# Patient Record
Sex: FEMALE | Race: WHITE | NOT HISPANIC OR LATINO | URBAN - METROPOLITAN AREA
[De-identification: names, ages, dates, MRNs, and addresses within clinical notes are randomized per-mention and may not be internally consistent; named-entity substitution may affect disease eponyms.]

---

## 2024-04-16 ENCOUNTER — OFFICE VISIT (OUTPATIENT)
Dept: PRIMARY CARE CLINIC | Facility: CLINIC | Age: 29
End: 2024-04-16
Payer: COMMERCIAL

## 2024-04-16 VITALS
OXYGEN SATURATION: 98 % | HEIGHT: 62 IN | HEART RATE: 78 BPM | DIASTOLIC BLOOD PRESSURE: 82 MMHG | SYSTOLIC BLOOD PRESSURE: 136 MMHG | WEIGHT: 220.31 LBS | TEMPERATURE: 98 F | BODY MASS INDEX: 40.54 KG/M2

## 2024-04-16 DIAGNOSIS — Z00.00 WELLNESS EXAMINATION: ICD-10-CM

## 2024-04-16 DIAGNOSIS — E66.09 CLASS 1 OBESITY DUE TO EXCESS CALORIES WITHOUT SERIOUS COMORBIDITY IN ADULT, UNSPECIFIED BMI: ICD-10-CM

## 2024-04-16 DIAGNOSIS — R73.9 HYPERGLYCEMIA: Primary | ICD-10-CM

## 2024-04-16 LAB
ALBUMIN SERPL-MCNC: 3.9 G/DL (ref 3.5–5)
ALBUMIN/GLOB SERPL: 1.1 RATIO (ref 1.1–2)
ALP SERPL-CCNC: 74 UNIT/L (ref 40–150)
ALT SERPL-CCNC: 39 UNIT/L (ref 0–55)
APPEARANCE UR: CLEAR
AST SERPL-CCNC: 32 UNIT/L (ref 5–34)
BACTERIA #/AREA URNS AUTO: ABNORMAL /HPF
BILIRUB SERPL-MCNC: 0.3 MG/DL
BILIRUB UR QL STRIP.AUTO: NEGATIVE
BUN SERPL-MCNC: 6.9 MG/DL (ref 7–18.7)
CALCIUM SERPL-MCNC: 9.5 MG/DL (ref 8.4–10.2)
CHLORIDE SERPL-SCNC: 105 MMOL/L (ref 98–107)
CHOLEST SERPL-MCNC: 212 MG/DL
CHOLEST/HDLC SERPL: 5 {RATIO} (ref 0–5)
CO2 SERPL-SCNC: 25 MMOL/L (ref 22–29)
COLOR UR AUTO: COLORLESS
CREAT SERPL-MCNC: 0.78 MG/DL (ref 0.55–1.02)
EST. AVERAGE GLUCOSE BLD GHB EST-MCNC: 102.5 MG/DL
ESTRADIOL SERPL HS-MCNC: 116 PG/ML
FERRITIN SERPL-MCNC: 95.31 NG/ML (ref 4.63–204)
FSH SERPL-ACNC: 5.12 MIU/ML
GFR SERPLBLD CREATININE-BSD FMLA CKD-EPI: >60 MLS/MIN/1.73/M2
GLOBULIN SER-MCNC: 3.5 GM/DL (ref 2.4–3.5)
GLUCOSE SERPL-MCNC: 115 MG/DL (ref 74–100)
GLUCOSE UR QL STRIP.AUTO: NORMAL
HBA1C MFR BLD: 5.2 %
HDLC SERPL-MCNC: 47 MG/DL (ref 35–60)
KETONES UR QL STRIP.AUTO: NEGATIVE
LDLC SERPL CALC-MCNC: 132 MG/DL (ref 50–140)
LEUKOCYTE ESTERASE UR QL STRIP.AUTO: 500
LH SERPL-ACNC: 6.19 MIU/ML
NITRITE UR QL STRIP.AUTO: NEGATIVE
PH UR STRIP.AUTO: 5.5 [PH]
POTASSIUM SERPL-SCNC: 4.6 MMOL/L (ref 3.5–5.1)
PROGEST SERPL-MCNC: 5.4 NG/ML
PROT SERPL-MCNC: 7.4 GM/DL (ref 6.4–8.3)
PROT UR QL STRIP.AUTO: NEGATIVE
RBC #/AREA URNS AUTO: ABNORMAL /HPF
RBC UR QL AUTO: NEGATIVE
SODIUM SERPL-SCNC: 138 MMOL/L (ref 136–145)
SP GR UR STRIP.AUTO: 1.01 (ref 1–1.03)
SPERM URNS QL MICRO: ABNORMAL /HPF
SQUAMOUS #/AREA URNS LPF: ABNORMAL /HPF
TRIGL SERPL-MCNC: 163 MG/DL (ref 37–140)
TSH SERPL-ACNC: 1.86 UIU/ML (ref 0.35–4.94)
UROBILINOGEN UR STRIP-ACNC: NORMAL
VLDLC SERPL CALC-MCNC: 33 MG/DL
WBC #/AREA URNS AUTO: ABNORMAL /HPF

## 2024-04-16 PROCEDURE — 84144 ASSAY OF PROGESTERONE: CPT | Performed by: FAMILY MEDICINE

## 2024-04-16 PROCEDURE — 83002 ASSAY OF GONADOTROPIN (LH): CPT | Performed by: FAMILY MEDICINE

## 2024-04-16 PROCEDURE — 80061 LIPID PANEL: CPT | Performed by: FAMILY MEDICINE

## 2024-04-16 PROCEDURE — 1160F RVW MEDS BY RX/DR IN RCRD: CPT | Mod: CPTII,,, | Performed by: FAMILY MEDICINE

## 2024-04-16 PROCEDURE — 81001 URINALYSIS AUTO W/SCOPE: CPT | Performed by: FAMILY MEDICINE

## 2024-04-16 PROCEDURE — 1159F MED LIST DOCD IN RCRD: CPT | Mod: CPTII,,, | Performed by: FAMILY MEDICINE

## 2024-04-16 PROCEDURE — 83036 HEMOGLOBIN GLYCOSYLATED A1C: CPT | Performed by: FAMILY MEDICINE

## 2024-04-16 PROCEDURE — 84443 ASSAY THYROID STIM HORMONE: CPT | Performed by: FAMILY MEDICINE

## 2024-04-16 PROCEDURE — 3075F SYST BP GE 130 - 139MM HG: CPT | Mod: CPTII,,, | Performed by: FAMILY MEDICINE

## 2024-04-16 PROCEDURE — 3079F DIAST BP 80-89 MM HG: CPT | Mod: CPTII,,, | Performed by: FAMILY MEDICINE

## 2024-04-16 PROCEDURE — 82670 ASSAY OF TOTAL ESTRADIOL: CPT | Performed by: FAMILY MEDICINE

## 2024-04-16 PROCEDURE — 99385 PREV VISIT NEW AGE 18-39: CPT | Mod: ,,, | Performed by: FAMILY MEDICINE

## 2024-04-16 PROCEDURE — 80053 COMPREHEN METABOLIC PANEL: CPT | Performed by: FAMILY MEDICINE

## 2024-04-16 PROCEDURE — 3008F BODY MASS INDEX DOCD: CPT | Mod: CPTII,,, | Performed by: FAMILY MEDICINE

## 2024-04-16 PROCEDURE — 82728 ASSAY OF FERRITIN: CPT | Performed by: FAMILY MEDICINE

## 2024-04-16 PROCEDURE — 83001 ASSAY OF GONADOTROPIN (FSH): CPT | Performed by: FAMILY MEDICINE

## 2024-04-16 PROCEDURE — 36415 COLL VENOUS BLD VENIPUNCTURE: CPT | Performed by: FAMILY MEDICINE

## 2024-04-16 RX ORDER — METFORMIN HYDROCHLORIDE 500 MG/1
500 TABLET ORAL
COMMUNITY

## 2024-04-16 NOTE — ASSESSMENT & PLAN NOTE
Will follow up on blood work.  Referral to GYN.  If unable to get it we can reschedule for pap smear visit.

## 2024-04-16 NOTE — PROGRESS NOTES
"  Family Medicine    Patient ID: [id number]     Chief Complaint: Establish Care (New patient to establish care)      HPI:     Rita Guan is a 29 y.o. female here today for an annual wellness visit and establish care.  On metformin by family NP, unsure why.  Last blood work 2022.  Last pap about 5 years ago.  Plan to see GYN.  Was on anxiety meds in the past.  Does not want to be on any and she stopped OCPs due to wanting to get pregnant.  Stopped OCPs 3 months ago.     Health Maintenance         Date Due Completion Date    Hepatitis C Screening Never done ---    Lipid Panel Never done ---    HIV Screening Never done ---    TETANUS VACCINE Never done ---    Pap Smear Never done ---    Influenza Vaccine (1) Never done ---    COVID-19 Vaccine (1 - 2023-24 season) Never done ---             No past medical history on file.     Past Surgical History:   Procedure Laterality Date    wisdom teeth removal          Social History     Tobacco Use    Smoking status: Never    Smokeless tobacco: Never   Substance and Sexual Activity    Alcohol use: Not Currently    Drug use: Never    Sexual activity: Yes        Current Outpatient Medications   Medication Instructions    metFORMIN (GLUCOPHAGE) 500 mg, Oral, With breakfast       Review of patient's allergies indicates:   Allergen Reactions    Hydrocodone Hives        Patient Care Team:  Joyce Jackson MD as PCP - General (Family Medicine)     Subjective:     Review of Systems    12 point review of systems conducted, negative except as stated in the history of present illness. See HPI for details.    Objective:     Visit Vitals  /82   Pulse 78   Temp 97.6 °F (36.4 °C)   Ht 5' 2" (1.575 m)   Wt 99.9 kg (220 lb 4.8 oz)   SpO2 98%   BMI 40.29 kg/m²       Physical Exam  Vitals and nursing note reviewed.   Constitutional:       Appearance: Normal appearance.   HENT:      Mouth/Throat:      Mouth: Mucous membranes are moist.   Cardiovascular:      Rate and Rhythm: Normal rate " "and regular rhythm.   Pulmonary:      Effort: Pulmonary effort is normal.      Breath sounds: Normal breath sounds.   Neurological:      General: No focal deficit present.      Mental Status: She is alert.   Psychiatric:         Mood and Affect: Mood normal.         Labs Reviewed:     Chemistry:  No results found for: "NA", "K", "CHLORIDE", "BUN", "CREATININE", "EGFRNORACEVR", "GLUCOSE", "CALCIUM", "ALKPHOS", "LABPROT", "ALBUMIN", "BILIDIR", "IBILI", "AST", "ALT", "MG", "PHOS", "[id number]", "TSH", "[id number]", "PSA"     No results found for: "HGBA1C", "MICROALBCREA"     Hematology:  No results found for: "WBC", "HGB", "HCT", "PLT"    Lipid Panel:  No results found for: "CHOL", "HDL", "LDL", "TRIG", "TOTALCHOLEST"     Urine:  No results found for: "COLORUA", "APPEARANCEUA", "SGUA", "PHUA", "PROTEINUA", "GLUCOSEUA", "KETONESUA", "BLOODUA", "NITRITESUA", "LEUKOCYTESUR", "RBCUA", "WBCUA", "BACTERIA", "SQEPUA", "HYALINECASTS", "CREATRANDUR", "PROTEINURINE", "UPROTCREA"     Assessment:       ICD-10-CM ICD-9-CM   1. Hyperglycemia  R73.9 790.29   2. Class 1 obesity due to excess calories without serious comorbidity in adult, unspecified BMI  E66.09 278.00   3. Wellness examination  Z00.00 V70.0        Plan:     Cervical Cancer Screening -  due, will try to get into GYN    Eye Exam - Recommend annually.    Dental Exam - Recommend biannual exams.     Vaccinations -   There is no immunization history on file for this patient.       1. Hyperglycemia  Assessment & Plan:  Continue metformin for now.  A1C ordered.   Increase vegetables in diet.     Orders:  -     CBC Auto Differential; Future; Expected date: 04/16/2024  -     Comprehensive Metabolic Panel; Future; Expected date: 04/16/2024  -     Lipid Panel; Future; Expected date: 04/16/2024  -     TSH; Future; Expected date: 04/16/2024  -     Hemoglobin A1C; Future; Expected date: 04/16/2024  -     Urinalysis; Future; Expected date: 04/16/2024  -     Ambulatory " referral/consult to Obstetrics / Gynecology; Future; Expected date: 04/23/2024  -     Estradiol; Future; Expected date: 04/16/2024  -     Ferritin; Future; Expected date: 04/16/2024  -     Luteinizing Hormone; Future; Expected date: 04/16/2024  -     Progesterone; Future; Expected date: 04/16/2024  -     Follicle Stimulating Hormone; Future; Expected date: 04/16/2024    2. Class 1 obesity due to excess calories without serious comorbidity in adult, unspecified BMI  Assessment & Plan:  Regular exercise, was on mounjaro. Consider medication to assist with weight management.     Orders:  -     CBC Auto Differential; Future; Expected date: 04/16/2024  -     Comprehensive Metabolic Panel; Future; Expected date: 04/16/2024  -     Lipid Panel; Future; Expected date: 04/16/2024  -     TSH; Future; Expected date: 04/16/2024  -     Hemoglobin A1C; Future; Expected date: 04/16/2024  -     Urinalysis; Future; Expected date: 04/16/2024  -     Ambulatory referral/consult to Obstetrics / Gynecology; Future; Expected date: 04/23/2024  -     Estradiol; Future; Expected date: 04/16/2024  -     Ferritin; Future; Expected date: 04/16/2024  -     Luteinizing Hormone; Future; Expected date: 04/16/2024  -     Progesterone; Future; Expected date: 04/16/2024  -     Follicle Stimulating Hormone; Future; Expected date: 04/16/2024    3. Wellness examination  Assessment & Plan:  Will follow up on blood work.  Referral to GYN.  If unable to get it we can reschedule for pap smear visit.            Follow up in about 6 weeks (around 5/28/2024) for Follow Up, pap if unable to get into GYN.. In addition to their scheduled follow up, the patient has also been instructed to follow up on as needed basis.     Future Appointments   Date Time Provider Department Center   4/16/2024 10:00 AM [facility]

## 2024-04-17 NOTE — PROGRESS NOTES
Glucose slightly elevated and cholesterol elevated. Otherwise labs within acceptable ranges. No change in treatment needed at this time.  Since you are planning to get pregnant we will hold off on starting a statin medication but please follow a strict low fat, high vegetable diet.

## 2024-04-29 ENCOUNTER — OFFICE VISIT (OUTPATIENT)
Dept: PRIMARY CARE CLINIC | Facility: CLINIC | Age: 29
End: 2024-04-29
Payer: COMMERCIAL

## 2024-04-29 VITALS
HEIGHT: 62 IN | SYSTOLIC BLOOD PRESSURE: 135 MMHG | DIASTOLIC BLOOD PRESSURE: 86 MMHG | WEIGHT: 220.13 LBS | OXYGEN SATURATION: 97 % | BODY MASS INDEX: 40.51 KG/M2 | HEART RATE: 107 BPM

## 2024-04-29 DIAGNOSIS — R73.9 HYPERGLYCEMIA: ICD-10-CM

## 2024-04-29 DIAGNOSIS — Z34.91 FIRST TRIMESTER PREGNANCY: Primary | ICD-10-CM

## 2024-04-29 PROCEDURE — 3044F HG A1C LEVEL LT 7.0%: CPT | Mod: CPTII,,, | Performed by: FAMILY MEDICINE

## 2024-04-29 PROCEDURE — 1159F MED LIST DOCD IN RCRD: CPT | Mod: CPTII,,, | Performed by: FAMILY MEDICINE

## 2024-04-29 PROCEDURE — 1160F RVW MEDS BY RX/DR IN RCRD: CPT | Mod: CPTII,,, | Performed by: FAMILY MEDICINE

## 2024-04-29 PROCEDURE — 99214 OFFICE O/P EST MOD 30 MIN: CPT | Mod: ,,, | Performed by: FAMILY MEDICINE

## 2024-04-29 PROCEDURE — 3075F SYST BP GE 130 - 139MM HG: CPT | Mod: CPTII,,, | Performed by: FAMILY MEDICINE

## 2024-04-29 PROCEDURE — 3079F DIAST BP 80-89 MM HG: CPT | Mod: CPTII,,, | Performed by: FAMILY MEDICINE

## 2024-04-29 PROCEDURE — 3008F BODY MASS INDEX DOCD: CPT | Mod: CPTII,,, | Performed by: FAMILY MEDICINE

## 2024-04-29 RX ORDER — INSULIN PUMP SYRINGE, 3 ML
EACH MISCELLANEOUS
Qty: 1 EACH | Refills: 0 | Status: SHIPPED | OUTPATIENT
Start: 2024-04-29

## 2024-04-29 RX ORDER — LANCETS
EACH MISCELLANEOUS
Qty: 120 EACH | Refills: 11 | Status: SHIPPED | OUTPATIENT
Start: 2024-04-29

## 2024-04-29 NOTE — PROGRESS NOTES
"  Family Medicine    Patient ID: [id number]     Chief Complaint: Follow-up (Possible pregnancy)      HPI:     Rita Guan is a 29 y.o. female here today for a follow up. Pt trying to conceive, missed her last period.  Reviewed labs done since last visit.  Overall non concerning.  Triglycerides elevated.   LMP 4 weeks ago.      Past Surgical History:   Procedure Laterality Date    wisdom teeth removal          Social History     Tobacco Use    Smoking status: Never    Smokeless tobacco: Never   Substance and Sexual Activity    Alcohol use: Not Currently    Drug use: Never    Sexual activity: Yes        Current Outpatient Medications   Medication Instructions    blood sugar diagnostic Strp To check BG four times daily, to use with insurance preferred meter    blood-glucose meter kit To check BG four times daily, to use with insurance preferred meter    lancets Misc To check BG four times daily, to use with insurance preferred meter    metFORMIN (GLUCOPHAGE) 500 mg, With breakfast    prenat.vits,amanda,min-iron-folic Tab 1 tablet, Oral, Daily       Review of patient's allergies indicates:   Allergen Reactions    Hydrocodone Hives        Patient Care Team:  Joyce Jackson MD as PCP - General (Family Medicine)     Subjective:     Review of Systems    12 point review of systems conducted, negative except as stated in the history of present illness. See HPI for details.    Objective:     Visit Vitals  /86   Pulse 107   Ht 5' 2" (1.575 m)   Wt 99.8 kg (220 lb 1.6 oz)   SpO2 97%   BMI 40.26 kg/m²       Physical Exam  Vitals and nursing note reviewed.   Constitutional:       Appearance: Normal appearance.   HENT:      Mouth/Throat:      Mouth: Mucous membranes are moist.   Cardiovascular:      Rate and Rhythm: Normal rate and regular rhythm.   Pulmonary:      Effort: Pulmonary effort is normal.      Breath sounds: Normal breath sounds.   Neurological:      General: No focal deficit present.      Mental Status: She is " "alert.   Psychiatric:         Mood and Affect: Mood normal.         Labs Reviewed:     Chemistry:  Lab Results   Component Value Date     04/16/2024    K 4.6 04/16/2024    CHLORIDE 105 04/16/2024    BUN 6.9 (L) 04/16/2024    CREATININE 0.78 04/16/2024    EGFRNORACEVR >60 04/16/2024    GLUCOSE 115 (H) 04/16/2024    CALCIUM 9.5 04/16/2024    ALKPHOS 74 04/16/2024    LABPROT 7.4 04/16/2024    ALBUMIN 3.9 04/16/2024    AST 32 04/16/2024    ALT 39 04/16/2024    TSH 1.865 04/16/2024        Lab Results   Component Value Date    HGBA1C 5.2 04/16/2024        Hematology:  No results found for: "WBC", "HGB", "HCT", "PLT"    Lipid Panel:  Lab Results   Component Value Date    CHOL 212 (H) 04/16/2024    HDL 47 04/16/2024    .00 04/16/2024    TRIG 163 (H) 04/16/2024    TOTALCHOLEST 5 04/16/2024        Urine:  Lab Results   Component Value Date    COLORUA Colorless 04/16/2024    APPEARANCEUA Clear 04/16/2024    SGUA 1.007 04/16/2024    PHUA 5.5 04/16/2024    PROTEINUA Negative 04/16/2024    GLUCOSEUA Normal 04/16/2024    KETONESUA Negative 04/16/2024    BLOODUA Negative 04/16/2024    NITRITESUA Negative 04/16/2024    LEUKOCYTESUR 500 (A) 04/16/2024    RBCUA 0-5 04/16/2024    WBCUA 6-10 (A) 04/16/2024    BACTERIA Trace 04/16/2024    SQEPUA Trace 04/16/2024        Assessment:       ICD-10-CM ICD-9-CM   1. First trimester pregnancy  Z34.91 V22.2   2. Hyperglycemia  R73.9 790.29        Plan:     1. First trimester pregnancy  Assessment & Plan:  OB referral placed. Discussed food and medication avoidance in pregnancy and anticipated changes.  Continue prenatal vitamin.  A prescription for a prenatal vitamin sent.     Orders:  -     blood-glucose meter kit; To check BG four times daily, to use with insurance preferred meter  Dispense: 1 each; Refill: 0  -     lancets Misc; To check BG four times daily, to use with insurance preferred meter  Dispense: 120 each; Refill: 11  -     blood sugar diagnostic Strp; To check BG four " times daily, to use with insurance preferred meter  Dispense: 120 each; Refill: 11  -     Ambulatory referral/consult to Obstetrics / Gynecology; Future; Expected date: 05/06/2024    2. Hyperglycemia  Assessment & Plan:  Check glucose in the morning.  Goal of less than 125.  Ok to stop metformin for now.      Orders:  -     blood-glucose meter kit; To check BG four times daily, to use with insurance preferred meter  Dispense: 1 each; Refill: 0  -     lancets Misc; To check BG four times daily, to use with insurance preferred meter  Dispense: 120 each; Refill: 11  -     blood sugar diagnostic Strp; To check BG four times daily, to use with insurance preferred meter  Dispense: 120 each; Refill: 11    Other orders  -     prenat.vits,amanda,min-iron-folic Tab; Take 1 tablet by mouth once daily.  Dispense: 90 each; Refill: 3         Follow up in about 1 year (around 4/29/2025) for Annual Wellness. In addition to their scheduled follow up, the patient has also been instructed to follow up on as needed basis.     No future appointments.     Joyce Jackson MD

## 2024-04-29 NOTE — ASSESSMENT & PLAN NOTE
OB referral placed. Discussed food and medication avoidance in pregnancy and anticipated changes.  Continue prenatal vitamin.  A prescription for a prenatal vitamin sent.

## 2024-07-19 ENCOUNTER — OFFICE VISIT (OUTPATIENT)
Dept: PRIMARY CARE CLINIC | Facility: CLINIC | Age: 29
End: 2024-07-19
Payer: COMMERCIAL

## 2024-07-19 VITALS
TEMPERATURE: 98 F | SYSTOLIC BLOOD PRESSURE: 108 MMHG | RESPIRATION RATE: 17 BRPM | WEIGHT: 221 LBS | BODY MASS INDEX: 40.67 KG/M2 | HEART RATE: 90 BPM | HEIGHT: 62 IN | DIASTOLIC BLOOD PRESSURE: 65 MMHG

## 2024-07-19 DIAGNOSIS — J20.9 ACUTE BRONCHITIS, UNSPECIFIED ORGANISM: Primary | ICD-10-CM

## 2024-07-19 PROCEDURE — 99213 OFFICE O/P EST LOW 20 MIN: CPT | Mod: ,,, | Performed by: FAMILY MEDICINE

## 2024-07-19 PROCEDURE — 3008F BODY MASS INDEX DOCD: CPT | Mod: CPTII,,, | Performed by: FAMILY MEDICINE

## 2024-07-19 PROCEDURE — 1159F MED LIST DOCD IN RCRD: CPT | Mod: CPTII,,, | Performed by: FAMILY MEDICINE

## 2024-07-19 PROCEDURE — 1160F RVW MEDS BY RX/DR IN RCRD: CPT | Mod: CPTII,,, | Performed by: FAMILY MEDICINE

## 2024-07-19 PROCEDURE — 3074F SYST BP LT 130 MM HG: CPT | Mod: CPTII,,, | Performed by: FAMILY MEDICINE

## 2024-07-19 PROCEDURE — 3078F DIAST BP <80 MM HG: CPT | Mod: CPTII,,, | Performed by: FAMILY MEDICINE

## 2024-07-19 PROCEDURE — 3044F HG A1C LEVEL LT 7.0%: CPT | Mod: CPTII,,, | Performed by: FAMILY MEDICINE

## 2024-07-19 RX ORDER — ALBUTEROL SULFATE 90 UG/1
2 AEROSOL, METERED RESPIRATORY (INHALATION) EVERY 6 HOURS PRN
Qty: 18 G | Refills: 0 | Status: SHIPPED | OUTPATIENT
Start: 2024-07-19 | End: 2025-07-19

## 2024-07-19 NOTE — PROGRESS NOTES
Family Medicine    Patient ID: [id number]     Chief Complaint: No chief complaint on file.      HPI:     Rita Guan is a 29 y.o. female here today for 5 days of cough, congestion, pharyngitis.  No fever or CP.  16 weeks pregnant.     History reviewed. No pertinent past medical history.     Past Surgical History:   Procedure Laterality Date    wisdom teeth removal          Social History     Tobacco Use    Smoking status: Never    Smokeless tobacco: Never   Substance and Sexual Activity    Alcohol use: Not Currently    Drug use: Never    Sexual activity: Yes     Partners: Male        Current Outpatient Medications   Medication Instructions    albuterol (PROVENTIL HFA) 90 mcg/actuation inhaler 2 puffs, Inhalation, Every 6 hours PRN, Rescue    blood sugar diagnostic Strp To check BG four times daily, to use with insurance preferred meter    blood-glucose meter kit To check BG four times daily, to use with insurance preferred meter    lancets Misc To check BG four times daily, to use with insurance preferred meter    prenat.vits,amanda,min-iron-folic Tab 1 tablet, Oral, Daily       Review of patient's allergies indicates:   Allergen Reactions    Hydrocodone Hives        Patient Care Team:  Joyce Jackson MD as PCP - General (Family Medicine)     Subjective:     Review of Systems   HENT:  Positive for sore throat. Negative for congestion, drooling, ear discharge, ear pain and trouble swallowing.    Respiratory:  Positive for cough. Negative for shortness of breath and stridor.    Gastrointestinal:  Negative for abdominal pain, diarrhea and vomiting.   Musculoskeletal:  Positive for neck pain.   Neurological:  Positive for headaches.       12 point review of systems conducted, negative except as stated in the history of present illness. See HPI for details.    Objective:     Visit Vitals  /65 (BP Location: Left arm, Patient Position: Sitting, BP Method: Large (Automatic))   Pulse 90   Temp 98.3 °F (36.8 °C)  "  Resp 17   Ht 5' 2" (1.575 m)   Wt 100.2 kg (221 lb)   BMI 40.42 kg/m²       Physical Exam  Vitals and nursing note reviewed.   Constitutional:       Appearance: Normal appearance. She is obese. She is not ill-appearing.   HENT:      Right Ear: Ear canal and external ear normal.      Left Ear: Ear canal and external ear normal.      Ears:      Comments: Fluid B TM     Nose: Congestion present.      Mouth/Throat:      Pharynx: Posterior oropharyngeal erythema present.      Comments: Pos PND.   Eyes:      Conjunctiva/sclera: Conjunctivae normal.   Cardiovascular:      Rate and Rhythm: Normal rate and regular rhythm.   Pulmonary:      Effort: Pulmonary effort is normal.      Breath sounds: Wheezing present.   Lymphadenopathy:      Cervical: No cervical adenopathy.   Skin:     General: Skin is warm.   Neurological:      General: No focal deficit present.      Mental Status: She is alert.   Psychiatric:         Mood and Affect: Mood normal.         Labs Reviewed:     Chemistry:  Lab Results   Component Value Date     04/16/2024    K 4.6 04/16/2024    BUN 6.9 (L) 04/16/2024    CREATININE 0.78 04/16/2024    EGFRNORACEVR >60 04/16/2024    GLUCOSE 115 (H) 04/16/2024    CALCIUM 9.5 04/16/2024    ALKPHOS 74 04/16/2024    LABPROT 7.4 04/16/2024    ALBUMIN 3.9 04/16/2024    AST 32 04/16/2024    ALT 39 04/16/2024    TSH 1.865 04/16/2024        Lab Results   Component Value Date    HGBA1C 5.2 04/16/2024        Hematology:  No results found for: "WBC", "HGB", "HCT", "PLT"    Lipid Panel:  Lab Results   Component Value Date    CHOL 212 (H) 04/16/2024    HDL 47 04/16/2024    .00 04/16/2024    TRIG 163 (H) 04/16/2024    TOTALCHOLEST 5 04/16/2024        Urine:  Lab Results   Component Value Date    APPEARANCEUA Clear 04/16/2024    SGUA 1.007 04/16/2024    PROTEINUA Negative 04/16/2024    KETONESUA Negative 04/16/2024    LEUKOCYTESUR 500 (A) 04/16/2024    RBCUA 0-5 04/16/2024    WBCUA 6-10 (A) 04/16/2024    BACTERIA Trace " 04/16/2024    SQEPUA Trace 04/16/2024        Assessment:       ICD-10-CM ICD-9-CM   1. Acute bronchitis, unspecified organism  J20.9 466.0        Plan:     1. Acute bronchitis, unspecified organism  Assessment & Plan:  Flonase and saline nasal spray twice a day, antihistamine at bedtime.  Force fluids.  Use albuterol inhaler or nebulizer two puffs every 4-6 hours as needed for wheeze. Cough may linger a few weeks but should not have fever, chest pain, or shortness of breath.       Orders:  -     albuterol (PROVENTIL HFA) 90 mcg/actuation inhaler; Inhale 2 puffs into the lungs every 6 (six) hours as needed for Wheezing. Rescue  Dispense: 18 g; Refill: 0         No follow-ups on file. In addition to their scheduled follow up, the patient has also been instructed to follow up on as needed basis.     Future Appointments   Date Time Provider Department Center   4/29/2025 10:00 AM [facility]

## 2024-07-22 PROBLEM — Z00.00 WELLNESS EXAMINATION: Status: RESOLVED | Noted: 2024-04-16 | Resolved: 2024-07-22

## 2024-11-29 ENCOUNTER — HOSPITAL ENCOUNTER (INPATIENT)
Facility: HOSPITAL | Age: 29
LOS: 4 days | Discharge: HOME | End: 2024-12-03
Attending: OBSTETRICS & GYNECOLOGY | Admitting: OBSTETRICS & GYNECOLOGY
Payer: COMMERCIAL

## 2024-11-29 ENCOUNTER — ANESTHESIA EVENT (OUTPATIENT)
Dept: OBSTETRICS AND GYNECOLOGY | Facility: HOSPITAL | Age: 29
End: 2024-11-29
Payer: COMMERCIAL

## 2024-11-29 ENCOUNTER — ANESTHESIA (OUTPATIENT)
Dept: OBSTETRICS AND GYNECOLOGY | Facility: HOSPITAL | Age: 29
End: 2024-11-29
Payer: COMMERCIAL

## 2024-11-29 DIAGNOSIS — Z98.891 STATUS POST PRIMARY LOW TRANSVERSE CESAREAN SECTION: Primary | ICD-10-CM

## 2024-11-29 DIAGNOSIS — O16.9 HYPERTENSION AFFECTING PREGNANCY: ICD-10-CM

## 2024-11-29 LAB
ABORH RETYPE: NORMAL
ALBUMIN SERPL-MCNC: 2.3 G/DL (ref 3.5–5)
ALBUMIN/GLOB SERPL: 0.8 RATIO (ref 1.1–2)
ALP SERPL-CCNC: 109 UNIT/L (ref 40–150)
ALT SERPL-CCNC: 31 UNIT/L (ref 0–55)
ANION GAP SERPL CALC-SCNC: 10 MEQ/L
AST SERPL-CCNC: 56 UNIT/L (ref 5–34)
BASOPHILS # BLD AUTO: 0.04 X10(3)/MCL
BASOPHILS NFR BLD AUTO: 0.3 %
BILIRUB SERPL-MCNC: 0.3 MG/DL
BUN SERPL-MCNC: 11.9 MG/DL (ref 7–18.7)
CALCIUM SERPL-MCNC: 8.6 MG/DL (ref 8.4–10.2)
CHLORIDE SERPL-SCNC: 109 MMOL/L (ref 98–107)
CO2 SERPL-SCNC: 17 MMOL/L (ref 22–29)
CREAT SERPL-MCNC: 0.82 MG/DL (ref 0.55–1.02)
CREAT UR-MCNC: 264.6 MG/DL (ref 45–106)
CREAT/UREA NIT SERPL: 15
EOSINOPHIL # BLD AUTO: 0.11 X10(3)/MCL (ref 0–0.9)
EOSINOPHIL NFR BLD AUTO: 0.9 %
ERYTHROCYTE [DISTWIDTH] IN BLOOD BY AUTOMATED COUNT: 13.6 % (ref 11.5–17)
GFR SERPLBLD CREATININE-BSD FMLA CKD-EPI: >60 ML/MIN/1.73/M2
GLOBULIN SER-MCNC: 3 GM/DL (ref 2.4–3.5)
GLUCOSE SERPL-MCNC: 76 MG/DL (ref 74–100)
GROUP & RH: NORMAL
HBV SURFACE AG SERPL QL IA: NONREACTIVE
HCT VFR BLD AUTO: 39.2 % (ref 37–47)
HGB BLD-MCNC: 13.2 G/DL (ref 12–16)
IMM GRANULOCYTES # BLD AUTO: 0.09 X10(3)/MCL (ref 0–0.04)
IMM GRANULOCYTES NFR BLD AUTO: 0.7 %
INDIRECT COOMBS: NORMAL
LDH SERPL-CCNC: 308 U/L (ref 125–220)
LYMPHOCYTES # BLD AUTO: 1.9 X10(3)/MCL (ref 0.6–4.6)
LYMPHOCYTES NFR BLD AUTO: 14.9 %
MCH RBC QN AUTO: 28.8 PG (ref 27–31)
MCHC RBC AUTO-ENTMCNC: 33.7 G/DL (ref 33–36)
MCV RBC AUTO: 85.4 FL (ref 80–94)
MONOCYTES # BLD AUTO: 0.75 X10(3)/MCL (ref 0.1–1.3)
MONOCYTES NFR BLD AUTO: 5.9 %
NEUTROPHILS # BLD AUTO: 9.88 X10(3)/MCL (ref 2.1–9.2)
NEUTROPHILS NFR BLD AUTO: 77.3 %
NRBC BLD AUTO-RTO: 0 %
PLATELET # BLD AUTO: 163 X10(3)/MCL (ref 130–400)
PMV BLD AUTO: 10.6 FL (ref 7.4–10.4)
POTASSIUM SERPL-SCNC: 4.4 MMOL/L (ref 3.5–5.1)
PROT SERPL-MCNC: 5.3 GM/DL (ref 6.4–8.3)
PROT UR STRIP-MCNC: >2000 MG/DL
RBC # BLD AUTO: 4.59 X10(6)/MCL (ref 4.2–5.4)
SODIUM SERPL-SCNC: 136 MMOL/L (ref 136–145)
SPECIMEN OUTDATE: NORMAL
T PALLIDUM AB SER QL: NONREACTIVE
URATE SERPL-MCNC: 7.5 MG/DL (ref 2.6–6)
WBC # BLD AUTO: 12.77 X10(3)/MCL (ref 4.5–11.5)

## 2024-11-29 PROCEDURE — 80053 COMPREHEN METABOLIC PANEL: CPT | Performed by: SPECIALIST

## 2024-11-29 PROCEDURE — 62322 NJX INTERLAMINAR LMBR/SAC: CPT

## 2024-11-29 PROCEDURE — 87389 HIV-1 AG W/HIV-1&-2 AB AG IA: CPT | Performed by: SPECIALIST

## 2024-11-29 PROCEDURE — 63600175 PHARM REV CODE 636 W HCPCS: Performed by: SPECIALIST

## 2024-11-29 PROCEDURE — 37000008 HC ANESTHESIA 1ST 15 MINUTES: Performed by: SPECIALIST

## 2024-11-29 PROCEDURE — 87340 HEPATITIS B SURFACE AG IA: CPT | Performed by: SPECIALIST

## 2024-11-29 PROCEDURE — 25000003 PHARM REV CODE 250

## 2024-11-29 PROCEDURE — 85025 COMPLETE CBC W/AUTO DIFF WBC: CPT | Performed by: SPECIALIST

## 2024-11-29 PROCEDURE — 86762 RUBELLA ANTIBODY: CPT | Performed by: SPECIALIST

## 2024-11-29 PROCEDURE — 36415 COLL VENOUS BLD VENIPUNCTURE: CPT | Performed by: SPECIALIST

## 2024-11-29 PROCEDURE — 36004724 HC OB OR TIME LEV III - 1ST 15 MIN: Performed by: SPECIALIST

## 2024-11-29 PROCEDURE — 36004725 HC OB OR TIME LEV III - EA ADD 15 MIN: Performed by: SPECIALIST

## 2024-11-29 PROCEDURE — 84550 ASSAY OF BLOOD/URIC ACID: CPT | Performed by: SPECIALIST

## 2024-11-29 PROCEDURE — 86901 BLOOD TYPING SEROLOGIC RH(D): CPT | Performed by: SPECIALIST

## 2024-11-29 PROCEDURE — 86923 COMPATIBILITY TEST ELECTRIC: CPT | Performed by: SPECIALIST

## 2024-11-29 PROCEDURE — 86780 TREPONEMA PALLIDUM: CPT | Performed by: SPECIALIST

## 2024-11-29 PROCEDURE — 63600175 PHARM REV CODE 636 W HCPCS: Performed by: OBSTETRICS & GYNECOLOGY

## 2024-11-29 PROCEDURE — 63600175 PHARM REV CODE 636 W HCPCS

## 2024-11-29 PROCEDURE — 27000492 HC SLEEVE, SCD T/L

## 2024-11-29 PROCEDURE — 25000003 PHARM REV CODE 250: Performed by: SPECIALIST

## 2024-11-29 PROCEDURE — 37000009 HC ANESTHESIA EA ADD 15 MINS: Performed by: SPECIALIST

## 2024-11-29 PROCEDURE — 96372 THER/PROPH/DIAG INJ SC/IM: CPT | Performed by: SPECIALIST

## 2024-11-29 PROCEDURE — 96374 THER/PROPH/DIAG INJ IV PUSH: CPT

## 2024-11-29 PROCEDURE — 63600175 PHARM REV CODE 636 W HCPCS: Mod: JZ,JG | Performed by: ANESTHESIOLOGY

## 2024-11-29 PROCEDURE — 51702 INSERT TEMP BLADDER CATH: CPT

## 2024-11-29 PROCEDURE — 71000033 HC RECOVERY, INTIAL HOUR: Performed by: SPECIALIST

## 2024-11-29 PROCEDURE — 96376 TX/PRO/DX INJ SAME DRUG ADON: CPT

## 2024-11-29 PROCEDURE — 82570 ASSAY OF URINE CREATININE: CPT | Performed by: SPECIALIST

## 2024-11-29 PROCEDURE — 83615 LACTATE (LD) (LDH) ENZYME: CPT | Performed by: SPECIALIST

## 2024-11-29 PROCEDURE — 99285 EMERGENCY DEPT VISIT HI MDM: CPT | Mod: 25

## 2024-11-29 PROCEDURE — 11000001 HC ACUTE MED/SURG PRIVATE ROOM

## 2024-11-29 RX ORDER — CARBOPROST TROMETHAMINE 250 UG/ML
250 INJECTION, SOLUTION INTRAMUSCULAR
Status: DISCONTINUED | OUTPATIENT
Start: 2024-11-29 | End: 2024-12-03 | Stop reason: HOSPADM

## 2024-11-29 RX ORDER — OXYTOCIN-SODIUM CHLORIDE 0.9% IV SOLN 30 UNIT/500ML 30-0.9/5 UT/ML-%
95 SOLUTION INTRAVENOUS CONTINUOUS PRN
OUTPATIENT
Start: 2024-11-29

## 2024-11-29 RX ORDER — OXYTOCIN-SODIUM CHLORIDE 0.9% IV SOLN 30 UNIT/500ML 30-0.9/5 UT/ML-%
SOLUTION INTRAVENOUS
Status: DISCONTINUED | OUTPATIENT
Start: 2024-11-29 | End: 2024-11-29

## 2024-11-29 RX ORDER — ONDANSETRON HYDROCHLORIDE 2 MG/ML
4 INJECTION, SOLUTION INTRAVENOUS EVERY 12 HOURS PRN
Status: DISCONTINUED | OUTPATIENT
Start: 2024-11-29 | End: 2024-12-03 | Stop reason: HOSPADM

## 2024-11-29 RX ORDER — MAGNESIUM SULFATE HEPTAHYDRATE 40 MG/ML
4 INJECTION, SOLUTION INTRAVENOUS ONCE
Status: COMPLETED | OUTPATIENT
Start: 2024-11-29 | End: 2024-11-29

## 2024-11-29 RX ORDER — DIPHENOXYLATE HYDROCHLORIDE AND ATROPINE SULFATE 2.5; .025 MG/1; MG/1
2 TABLET ORAL EVERY 6 HOURS PRN
Status: DISCONTINUED | OUTPATIENT
Start: 2024-11-29 | End: 2024-12-03 | Stop reason: HOSPADM

## 2024-11-29 RX ORDER — METHYLERGONOVINE MALEATE 0.2 MG/ML
200 INJECTION INTRAVENOUS ONCE AS NEEDED
Status: DISCONTINUED | OUTPATIENT
Start: 2024-11-29 | End: 2024-12-03 | Stop reason: HOSPADM

## 2024-11-29 RX ORDER — PROCHLORPERAZINE EDISYLATE 5 MG/ML
5 INJECTION INTRAMUSCULAR; INTRAVENOUS EVERY 6 HOURS PRN
Status: DISCONTINUED | OUTPATIENT
Start: 2024-11-29 | End: 2024-12-03 | Stop reason: HOSPADM

## 2024-11-29 RX ORDER — MUPIROCIN 20 MG/G
OINTMENT TOPICAL
Status: DISCONTINUED | OUTPATIENT
Start: 2024-11-29 | End: 2024-12-03 | Stop reason: HOSPADM

## 2024-11-29 RX ORDER — CEFAZOLIN SODIUM 1 G/3ML
INJECTION, POWDER, FOR SOLUTION INTRAMUSCULAR; INTRAVENOUS
Status: DISCONTINUED | OUTPATIENT
Start: 2024-11-29 | End: 2024-11-29

## 2024-11-29 RX ORDER — OXYTOCIN-SODIUM CHLORIDE 0.9% IV SOLN 30 UNIT/500ML 30-0.9/5 UT/ML-%
10 SOLUTION INTRAVENOUS ONCE AS NEEDED
Status: DISCONTINUED | OUTPATIENT
Start: 2024-11-29 | End: 2024-12-03 | Stop reason: HOSPADM

## 2024-11-29 RX ORDER — OXYTOCIN 10 [USP'U]/ML
10 INJECTION, SOLUTION INTRAMUSCULAR; INTRAVENOUS ONCE AS NEEDED
Status: DISCONTINUED | OUTPATIENT
Start: 2024-11-29 | End: 2024-12-03 | Stop reason: HOSPADM

## 2024-11-29 RX ORDER — CALCIUM GLUCONATE 98 MG/ML
1 INJECTION, SOLUTION INTRAVENOUS
Status: DISCONTINUED | OUTPATIENT
Start: 2024-11-29 | End: 2024-12-03 | Stop reason: HOSPADM

## 2024-11-29 RX ORDER — SODIUM CITRATE AND CITRIC ACID MONOHYDRATE 334; 500 MG/5ML; MG/5ML
30 SOLUTION ORAL
Status: DISCONTINUED | OUTPATIENT
Start: 2024-11-29 | End: 2024-11-29

## 2024-11-29 RX ORDER — SODIUM CHLORIDE, SODIUM LACTATE, POTASSIUM CHLORIDE, CALCIUM CHLORIDE 600; 310; 30; 20 MG/100ML; MG/100ML; MG/100ML; MG/100ML
INJECTION, SOLUTION INTRAVENOUS CONTINUOUS
Status: DISCONTINUED | OUTPATIENT
Start: 2024-11-29 | End: 2024-12-03 | Stop reason: HOSPADM

## 2024-11-29 RX ORDER — FENTANYL CITRATE 50 UG/ML
INJECTION, SOLUTION INTRAMUSCULAR; INTRAVENOUS
Status: DISCONTINUED | OUTPATIENT
Start: 2024-11-29 | End: 2024-11-29

## 2024-11-29 RX ORDER — ONDANSETRON 4 MG/1
8 TABLET, ORALLY DISINTEGRATING ORAL EVERY 8 HOURS PRN
Status: DISCONTINUED | OUTPATIENT
Start: 2024-11-29 | End: 2024-12-03 | Stop reason: HOSPADM

## 2024-11-29 RX ORDER — OXYTOCIN-SODIUM CHLORIDE 0.9% IV SOLN 30 UNIT/500ML 30-0.9/5 UT/ML-%
10 SOLUTION INTRAVENOUS ONCE AS NEEDED
Status: DISCONTINUED | OUTPATIENT
Start: 2024-11-29 | End: 2024-11-29

## 2024-11-29 RX ORDER — FAMOTIDINE 10 MG/ML
20 INJECTION INTRAVENOUS
Status: DISCONTINUED | OUTPATIENT
Start: 2024-11-29 | End: 2024-12-03 | Stop reason: HOSPADM

## 2024-11-29 RX ORDER — MISOPROSTOL 100 UG/1
800 TABLET ORAL ONCE AS NEEDED
Status: DISCONTINUED | OUTPATIENT
Start: 2024-11-29 | End: 2024-12-03 | Stop reason: HOSPADM

## 2024-11-29 RX ORDER — LABETALOL HYDROCHLORIDE 5 MG/ML
40 INJECTION, SOLUTION INTRAVENOUS ONCE AS NEEDED
Status: COMPLETED | OUTPATIENT
Start: 2024-11-29 | End: 2024-11-29

## 2024-11-29 RX ORDER — LABETALOL HYDROCHLORIDE 5 MG/ML
80 INJECTION, SOLUTION INTRAVENOUS ONCE AS NEEDED
Status: COMPLETED | OUTPATIENT
Start: 2024-11-29 | End: 2024-11-29

## 2024-11-29 RX ORDER — ONDANSETRON HYDROCHLORIDE 2 MG/ML
INJECTION, SOLUTION INTRAVENOUS
Status: DISCONTINUED | OUTPATIENT
Start: 2024-11-29 | End: 2024-11-29

## 2024-11-29 RX ORDER — ACETAMINOPHEN 10 MG/ML
INJECTION, SOLUTION INTRAVENOUS
Status: DISCONTINUED | OUTPATIENT
Start: 2024-11-29 | End: 2024-11-29

## 2024-11-29 RX ORDER — SODIUM CHLORIDE, SODIUM LACTATE, POTASSIUM CHLORIDE, CALCIUM CHLORIDE 600; 310; 30; 20 MG/100ML; MG/100ML; MG/100ML; MG/100ML
1000 INJECTION, SOLUTION INTRAVENOUS CONTINUOUS
Status: DISCONTINUED | OUTPATIENT
Start: 2024-11-29 | End: 2024-11-30

## 2024-11-29 RX ORDER — MAGNESIUM SULFATE HEPTAHYDRATE 40 MG/ML
2 INJECTION, SOLUTION INTRAVENOUS CONTINUOUS
Status: DISCONTINUED | OUTPATIENT
Start: 2024-11-29 | End: 2024-12-03 | Stop reason: HOSPADM

## 2024-11-29 RX ORDER — METHYLERGONOVINE MALEATE 0.2 MG/ML
200 INJECTION INTRAVENOUS
Status: DISCONTINUED | OUTPATIENT
Start: 2024-11-29 | End: 2024-12-03 | Stop reason: HOSPADM

## 2024-11-29 RX ORDER — LABETALOL HYDROCHLORIDE 5 MG/ML
20 INJECTION, SOLUTION INTRAVENOUS ONCE AS NEEDED
Status: COMPLETED | OUTPATIENT
Start: 2024-11-29 | End: 2024-11-30

## 2024-11-29 RX ORDER — MORPHINE SULFATE 1 MG/ML
INJECTION, SOLUTION EPIDURAL; INTRATHECAL; INTRAVENOUS
Status: DISCONTINUED | OUTPATIENT
Start: 2024-11-29 | End: 2024-11-29

## 2024-11-29 RX ORDER — EPHEDRINE SULFATE 50 MG/ML
INJECTION, SOLUTION INTRAVENOUS
Status: DISCONTINUED | OUTPATIENT
Start: 2024-11-29 | End: 2024-11-29

## 2024-11-29 RX ORDER — OXYTOCIN-SODIUM CHLORIDE 0.9% IV SOLN 30 UNIT/500ML 30-0.9/5 UT/ML-%
95 SOLUTION INTRAVENOUS ONCE AS NEEDED
Status: DISCONTINUED | OUTPATIENT
Start: 2024-11-29 | End: 2024-12-03 | Stop reason: HOSPADM

## 2024-11-29 RX ORDER — PROPOFOL 10 MG/ML
VIAL (ML) INTRAVENOUS
Status: DISCONTINUED | OUTPATIENT
Start: 2024-11-29 | End: 2024-11-29

## 2024-11-29 RX ORDER — PHENYLEPHRINE HCL IN 0.9% NACL 1 MG/10 ML
SYRINGE (ML) INTRAVENOUS
Status: DISCONTINUED | OUTPATIENT
Start: 2024-11-29 | End: 2024-11-29

## 2024-11-29 RX ORDER — SODIUM CHLORIDE, SODIUM LACTATE, POTASSIUM CHLORIDE, CALCIUM CHLORIDE 600; 310; 30; 20 MG/100ML; MG/100ML; MG/100ML; MG/100ML
INJECTION, SOLUTION INTRAVENOUS CONTINUOUS PRN
Status: DISCONTINUED | OUTPATIENT
Start: 2024-11-29 | End: 2024-11-29

## 2024-11-29 RX ORDER — DEXAMETHASONE SODIUM PHOSPHATE 4 MG/ML
INJECTION, SOLUTION INTRA-ARTICULAR; INTRALESIONAL; INTRAMUSCULAR; INTRAVENOUS; SOFT TISSUE
Status: DISCONTINUED | OUTPATIENT
Start: 2024-11-29 | End: 2024-11-29

## 2024-11-29 RX ORDER — BUPIVACAINE HYDROCHLORIDE 7.5 MG/ML
INJECTION, SOLUTION EPIDURAL; RETROBULBAR
Status: COMPLETED | OUTPATIENT
Start: 2024-11-29 | End: 2024-11-29

## 2024-11-29 RX ORDER — MISOPROSTOL 100 UG/1
800 TABLET ORAL
Status: DISCONTINUED | OUTPATIENT
Start: 2024-11-29 | End: 2024-12-03 | Stop reason: HOSPADM

## 2024-11-29 RX ORDER — NALOXONE HCL 0.4 MG/ML
0.04 VIAL (ML) INJECTION
Status: DISCONTINUED | OUTPATIENT
Start: 2024-11-29 | End: 2024-12-03 | Stop reason: HOSPADM

## 2024-11-29 RX ORDER — KETOROLAC TROMETHAMINE 30 MG/ML
INJECTION, SOLUTION INTRAMUSCULAR; INTRAVENOUS
Status: DISCONTINUED | OUTPATIENT
Start: 2024-11-29 | End: 2024-11-29

## 2024-11-29 RX ORDER — HYDRALAZINE HYDROCHLORIDE 20 MG/ML
10 INJECTION INTRAMUSCULAR; INTRAVENOUS ONCE AS NEEDED
Status: COMPLETED | OUTPATIENT
Start: 2024-11-29 | End: 2024-11-29

## 2024-11-29 RX ORDER — OXYTOCIN-SODIUM CHLORIDE 0.9% IV SOLN 30 UNIT/500ML 30-0.9/5 UT/ML-%
95 SOLUTION INTRAVENOUS CONTINUOUS PRN
Status: DISCONTINUED | OUTPATIENT
Start: 2024-11-29 | End: 2024-12-03 | Stop reason: HOSPADM

## 2024-11-29 RX ORDER — BETAMETHASONE SODIUM PHOSPHATE AND BETAMETHASONE ACETATE 3; 3 MG/ML; MG/ML
12 INJECTION, SUSPENSION INTRA-ARTICULAR; INTRALESIONAL; INTRAMUSCULAR; SOFT TISSUE
Status: DISCONTINUED | OUTPATIENT
Start: 2024-11-29 | End: 2024-11-29

## 2024-11-29 RX ORDER — CEFAZOLIN 2 G/1
2 INJECTION, POWDER, FOR SOLUTION INTRAMUSCULAR; INTRAVENOUS ONCE AS NEEDED
Status: DISCONTINUED | OUTPATIENT
Start: 2024-11-29 | End: 2024-12-03 | Stop reason: HOSPADM

## 2024-11-29 RX ADMIN — HYDRALAZINE HYDROCHLORIDE 10 MG: 20 INJECTION INTRAMUSCULAR; INTRAVENOUS at 06:11

## 2024-11-29 RX ADMIN — Medication 200 MCG: at 08:11

## 2024-11-29 RX ADMIN — EPHEDRINE SULFATE 50 MG: 50 INJECTION INTRAVENOUS at 08:11

## 2024-11-29 RX ADMIN — PROPOFOL 30 MG: 10 INJECTION, EMULSION INTRAVENOUS at 08:11

## 2024-11-29 RX ADMIN — ONDANSETRON 8 MG: 2 INJECTION INTRAMUSCULAR; INTRAVENOUS at 08:11

## 2024-11-29 RX ADMIN — LABETALOL HYDROCHLORIDE 40 MG: 5 INJECTION, SOLUTION INTRAVENOUS at 05:11

## 2024-11-29 RX ADMIN — MORPHINE SULFATE 0.1 MG: 1 INJECTION, SOLUTION EPIDURAL; INTRATHECAL; INTRAVENOUS at 07:11

## 2024-11-29 RX ADMIN — ACETAMINOPHEN 1000 MG: 10 INJECTION, SOLUTION INTRAVENOUS at 08:11

## 2024-11-29 RX ADMIN — Medication 500 ML: at 08:11

## 2024-11-29 RX ADMIN — BETAMETHASONE SODIUM PHOSPHATE AND BETAMETHASONE ACETATE 12 MG: 3; 3 INJECTION, SUSPENSION INTRA-ARTICULAR; INTRALESIONAL; INTRAMUSCULAR at 05:11

## 2024-11-29 RX ADMIN — DEXAMETHASONE SODIUM PHOSPHATE 8 MG: 4 INJECTION, SOLUTION INTRA-ARTICULAR; INTRALESIONAL; INTRAMUSCULAR; INTRAVENOUS; SOFT TISSUE at 08:11

## 2024-11-29 RX ADMIN — Medication 95 MILLI-UNITS/MIN: at 09:11

## 2024-11-29 RX ADMIN — BUPIVACAINE HYDROCHLORIDE 1.7 ML: 7.5 INJECTION, SOLUTION EPIDURAL; RETROBULBAR at 07:11

## 2024-11-29 RX ADMIN — Medication 100 MCG: at 08:11

## 2024-11-29 RX ADMIN — CEFAZOLIN 2 G: 330 INJECTION, POWDER, FOR SOLUTION INTRAMUSCULAR; INTRAVENOUS at 08:11

## 2024-11-29 RX ADMIN — MAGNESIUM SULFATE HEPTAHYDRATE 4 G: 40 INJECTION, SOLUTION INTRAVENOUS at 07:11

## 2024-11-29 RX ADMIN — SODIUM CITRATE AND CITRIC ACID MONOHYDRATE 30 ML: 500; 334 SOLUTION ORAL at 07:11

## 2024-11-29 RX ADMIN — KETOROLAC TROMETHAMINE 30 MG: 30 INJECTION, SOLUTION INTRAMUSCULAR; INTRAVENOUS at 08:11

## 2024-11-29 RX ADMIN — LABETALOL HYDROCHLORIDE 20 MG: 5 INJECTION, SOLUTION INTRAVENOUS at 05:11

## 2024-11-29 RX ADMIN — LABETALOL HYDROCHLORIDE 80 MG: 5 INJECTION, SOLUTION INTRAVENOUS at 06:11

## 2024-11-29 RX ADMIN — SODIUM CHLORIDE, POTASSIUM CHLORIDE, SODIUM LACTATE AND CALCIUM CHLORIDE: 600; 310; 30; 20 INJECTION, SOLUTION INTRAVENOUS at 07:11

## 2024-11-29 RX ADMIN — SODIUM CHLORIDE, POTASSIUM CHLORIDE, SODIUM LACTATE AND CALCIUM CHLORIDE 1000 ML: 600; 310; 30; 20 INJECTION, SOLUTION INTRAVENOUS at 07:11

## 2024-11-29 RX ADMIN — FENTANYL CITRATE 10 MCG: 50 INJECTION, SOLUTION INTRAMUSCULAR; INTRAVENOUS at 07:11

## 2024-11-29 NOTE — ED PROVIDER NOTES
"       EDMUNDO NOTE     Admit Date: 2024  EDMUNDO Physician: Ricki Krueger  Primary OBGYN: Dr. Junior    Chief Complaint   Patient presents with    Hypertension       Hospital Course:  Rita Guan is a 29 y.o.  at 34w6d presents complaining of elevated blood pressure using a home monitor in excess of 200 systolic and 110 diastolic.  Patient denies any other symptomatology besides swelling.  She denies headache, visual changes, altered sensation, abdominal pain or other issues.  She has reported any elevated blood pressures earlier in this pregnancy.    This IUP is complicated by hyperglycemia and obesity.    Patient denies vaginal bleeding, leakage of fluid, contractions, headache, vision changes, RUQ pain, dysuria, fever, and nausea/vomiting.  Fetal Movement: normal.      BP (!) 182/121   Pulse 68   Temp 98.6 °F (37 °C) (Oral)   Resp 18   Ht 5' 1.5" (1.562 m)   Wt 110.2 kg (243 lb)   SpO2 97%   BMI 45.17 kg/m²   Temp:  [98.6 °F (37 °C)] 98.6 °F (37 °C)  Pulse:  [68-86] 68  Resp:  [18] 18  SpO2:  [97 %-98 %] 97 %  BP: (182)/(121) 182/121    General: in no apparent distress  Cardiovascular: regular rate and rhythm no murmurs  Respiratory: clear to auscultation, no wheezes, rales or rhonchi, symmetric air entry  Abdominal: FHT present  Back: lumbar tenderness present CVA tenderness none  Extremeties no redness or tenderness in the calves or thighs edema 3+    SSE:   SVE:  Deferred      FHT: Category 1  TOCO:  No contractions are noted    Medical Decision Making:  Preeclampsia labs and 24 hour urine will be initiated.  Extreme hypertension protocol with labetalol will be initiated.  IV fluid access.  Sequential blood pressure monitoring.  Initial steroid dosing.    LABS:   No results found for this or any previous visit (from the past 24 hours).  [unfilled]     Imaging Results    None          ASSESMENT: Rita Guan is a 29 y.o.   at 34w6d with severely elevated blood pressures " consistent with preeclampsia.    Discharge Diagnosis/Clinical Impression:  Pregnancy 34 weeks.  Preeclampsia.    Status:Guarded    Disposition:  admitted to primary OB service    Medications:       Patient Instructions:   Current Discharge Medication List        CONTINUE these medications which have NOT CHANGED    Details   prenat.vits,amanda,min-iron-folic Tab Take 1 tablet by mouth once daily.  Qty: 90 each, Refills: 3      albuterol (PROVENTIL HFA) 90 mcg/actuation inhaler Inhale 2 puffs into the lungs every 6 (six) hours as needed for Wheezing. Rescue  Qty: 18 g, Refills: 0    Associated Diagnoses: Acute bronchitis, unspecified organism      blood sugar diagnostic Strp To check BG four times daily, to use with insurance preferred meter  Qty: 120 each, Refills: 11    Associated Diagnoses: First trimester pregnancy; Hyperglycemia      blood-glucose meter kit To check BG four times daily, to use with insurance preferred meter  Qty: 1 each, Refills: 0    Associated Diagnoses: First trimester pregnancy; Hyperglycemia      lancets Misc To check BG four times daily, to use with insurance preferred meter  Qty: 120 each, Refills: 11    Associated Diagnoses: First trimester pregnancy; Hyperglycemia         Plan:  See medical decision making    -  She will follow up with her primary OB.    No discharge procedures on file.    Ricki Krueger MD  OB-GYN Hospitalist       Ricki Krueger MD  11/29/24 [id number]

## 2024-11-29 NOTE — ED TRIAGE NOTES
IUP 34.6 weeks c/o increase in swelling today. Checked BP at home and reports it was 213/139. Pt denies HA, dizziness, and blurred vision. Denies LOF, VB, and contractions. Reports good fetal movement.   5

## 2024-11-29 NOTE — H&P
"       HISTORY AND PHYSICAL     Admit Date: 2024  EDMUNDO Physician: Ricki Krueger  Primary OBGYN: Dr. Junior    Chief Complaint   Patient presents with    Hypertension       Hospital Course:  Rita Guan is a 29 y.o.  at 34w6d presents complaining of elevated blood pressure using a home monitor in excess of 200 systolic and 110 diastolic.  Patient denies any other symptomatology besides swelling.  She denies headache, visual changes, altered sensation, abdominal pain or other issues.  She has reported any elevated blood pressures earlier in this pregnancy.    This IUP is complicated by hyperglycemia and obesity.    Patient denies vaginal bleeding, leakage of fluid, contractions, headache, vision changes, RUQ pain, dysuria, fever, and nausea/vomiting.  Fetal Movement: normal.      BP (!) 182/121   Pulse 68   Temp 98.6 °F (37 °C) (Oral)   Resp 18   Ht 5' 1.5" (1.562 m)   Wt 110.2 kg (243 lb)   SpO2 97%   BMI 45.17 kg/m²   Temp:  [98.6 °F (37 °C)] 98.6 °F (37 °C)  Pulse:  [68-86] 68  Resp:  [18] 18  SpO2:  [97 %-98 %] 97 %  BP: (182)/(121) 182/121    General: in no apparent distress  Cardiovascular: regular rate and rhythm no murmurs  Respiratory: clear to auscultation, no wheezes, rales or rhonchi, symmetric air entry  Abdominal: FHT present  Back: lumbar tenderness present CVA tenderness none  Extremeties no redness or tenderness in the calves or thighs edema 3+    SSE:   SVE:  Deferred      FHT: Category 1  TOCO:  No contractions are noted    Medical Decision Making:  Preeclampsia labs and 24 hour urine will be initiated.  Extreme hypertension protocol with labetalol will be initiated.  IV fluid access.  Sequential blood pressure monitoring.  Initial steroid dosing.    LABS:   No results found for this or any previous visit (from the past 24 hours).  [unfilled]     Imaging Results    None          ASSESMENT: Rita Guan is a 29 y.o.   at 34w6d with severely elevated blood " pressures consistent with preeclampsia.    Discharge Diagnosis/Clinical Impression:  Pregnancy 34 weeks.  Preeclampsia.    Status:Guarded    Disposition:  admitted to primary OB service    Medications:       Patient Instructions:   Current Discharge Medication List        CONTINUE these medications which have NOT CHANGED    Details   prenat.vits,amanda,min-iron-folic Tab Take 1 tablet by mouth once daily.  Qty: 90 each, Refills: 3      albuterol (PROVENTIL HFA) 90 mcg/actuation inhaler Inhale 2 puffs into the lungs every 6 (six) hours as needed for Wheezing. Rescue  Qty: 18 g, Refills: 0    Associated Diagnoses: Acute bronchitis, unspecified organism      blood sugar diagnostic Strp To check BG four times daily, to use with insurance preferred meter  Qty: 120 each, Refills: 11    Associated Diagnoses: First trimester pregnancy; Hyperglycemia      blood-glucose meter kit To check BG four times daily, to use with insurance preferred meter  Qty: 1 each, Refills: 0    Associated Diagnoses: First trimester pregnancy; Hyperglycemia      lancets Misc To check BG four times daily, to use with insurance preferred meter  Qty: 120 each, Refills: 11    Associated Diagnoses: First trimester pregnancy; Hyperglycemia         Plan:  See medical decision making    -  She will follow up with her primary OB.    No discharge procedures on file.    Ricki Krueger MD  OB-GYN Hospitalist       Ricki Krueger MD  11/29/24 [id number]

## 2024-11-30 PROBLEM — Z98.891 STATUS POST PRIMARY LOW TRANSVERSE CESAREAN SECTION: Status: ACTIVE | Noted: 2024-11-30

## 2024-11-30 PROBLEM — O14.13 SEVERE PREECLAMPSIA, THIRD TRIMESTER: Status: ACTIVE | Noted: 2024-11-30

## 2024-11-30 LAB
BASOPHILS # BLD AUTO: 0.03 X10(3)/MCL
BASOPHILS NFR BLD AUTO: 0.2 %
EOSINOPHIL # BLD AUTO: 0 X10(3)/MCL (ref 0–0.9)
EOSINOPHIL NFR BLD AUTO: 0 %
ERYTHROCYTE [DISTWIDTH] IN BLOOD BY AUTOMATED COUNT: 13.9 % (ref 11.5–17)
HCT VFR BLD AUTO: 37.2 % (ref 37–47)
HGB BLD-MCNC: 12.5 G/DL (ref 12–16)
IMM GRANULOCYTES # BLD AUTO: 0.25 X10(3)/MCL (ref 0–0.04)
IMM GRANULOCYTES NFR BLD AUTO: 1.5 %
LYMPHOCYTES # BLD AUTO: 1.01 X10(3)/MCL (ref 0.6–4.6)
LYMPHOCYTES NFR BLD AUTO: 5.9 %
MCH RBC QN AUTO: 28.7 PG (ref 27–31)
MCHC RBC AUTO-ENTMCNC: 33.6 G/DL (ref 33–36)
MCV RBC AUTO: 85.5 FL (ref 80–94)
MONOCYTES # BLD AUTO: 0.2 X10(3)/MCL (ref 0.1–1.3)
MONOCYTES NFR BLD AUTO: 1.2 %
NEUTROPHILS # BLD AUTO: 15.73 X10(3)/MCL (ref 2.1–9.2)
NEUTROPHILS NFR BLD AUTO: 91.2 %
NRBC BLD AUTO-RTO: 0 %
PLATELET # BLD AUTO: 163 X10(3)/MCL (ref 130–400)
PMV BLD AUTO: 10.8 FL (ref 7.4–10.4)
RBC # BLD AUTO: 4.35 X10(6)/MCL (ref 4.2–5.4)
WBC # BLD AUTO: 17.22 X10(3)/MCL (ref 4.5–11.5)

## 2024-11-30 PROCEDURE — 11000001 HC ACUTE MED/SURG PRIVATE ROOM

## 2024-11-30 PROCEDURE — 63600175 PHARM REV CODE 636 W HCPCS: Performed by: OBSTETRICS & GYNECOLOGY

## 2024-11-30 PROCEDURE — 63600175 PHARM REV CODE 636 W HCPCS: Mod: JZ,JG

## 2024-11-30 PROCEDURE — 63600175 PHARM REV CODE 636 W HCPCS: Performed by: SPECIALIST

## 2024-11-30 PROCEDURE — 25000003 PHARM REV CODE 250: Performed by: SPECIALIST

## 2024-11-30 PROCEDURE — 25000003 PHARM REV CODE 250: Performed by: OBSTETRICS & GYNECOLOGY

## 2024-11-30 PROCEDURE — 85025 COMPLETE CBC W/AUTO DIFF WBC: CPT | Performed by: SPECIALIST

## 2024-11-30 PROCEDURE — 36415 COLL VENOUS BLD VENIPUNCTURE: CPT | Performed by: SPECIALIST

## 2024-11-30 RX ORDER — SODIUM CHLORIDE 0.9 % (FLUSH) 0.9 %
10 SYRINGE (ML) INJECTION
Status: DISCONTINUED | OUTPATIENT
Start: 2024-11-30 | End: 2024-12-03 | Stop reason: HOSPADM

## 2024-11-30 RX ORDER — LABETALOL HCL 20 MG/4 ML
40 SYRINGE (ML) INTRAVENOUS ONCE AS NEEDED
Status: DISCONTINUED | OUTPATIENT
Start: 2024-11-30 | End: 2024-12-03 | Stop reason: HOSPADM

## 2024-11-30 RX ORDER — AMOXICILLIN 250 MG
1 CAPSULE ORAL NIGHTLY PRN
Status: DISCONTINUED | OUTPATIENT
Start: 2024-11-30 | End: 2024-12-03 | Stop reason: HOSPADM

## 2024-11-30 RX ORDER — OXYCODONE AND ACETAMINOPHEN 5; 325 MG/1; MG/1
1 TABLET ORAL EVERY 4 HOURS PRN
Status: DISCONTINUED | OUTPATIENT
Start: 2024-11-30 | End: 2024-12-03 | Stop reason: HOSPADM

## 2024-11-30 RX ORDER — KETOROLAC TROMETHAMINE 30 MG/ML
30 INJECTION, SOLUTION INTRAMUSCULAR; INTRAVENOUS EVERY 8 HOURS
Status: COMPLETED | OUTPATIENT
Start: 2024-11-30 | End: 2024-11-30

## 2024-11-30 RX ORDER — PRENATAL WITH FERROUS FUM AND FOLIC ACID 3080; 920; 120; 400; 22; 1.84; 3; 20; 10; 1; 12; 200; 27; 25; 2 [IU]/1; [IU]/1; MG/1; [IU]/1; MG/1; MG/1; MG/1; MG/1; MG/1; MG/1; UG/1; MG/1; MG/1; MG/1; MG/1
1 TABLET ORAL DAILY
Status: DISCONTINUED | OUTPATIENT
Start: 2024-11-30 | End: 2024-12-03 | Stop reason: HOSPADM

## 2024-11-30 RX ORDER — ADHESIVE BANDAGE
30 BANDAGE TOPICAL 2 TIMES DAILY PRN
Status: DISCONTINUED | OUTPATIENT
Start: 2024-12-01 | End: 2024-12-03 | Stop reason: HOSPADM

## 2024-11-30 RX ORDER — BISACODYL 10 MG/1
10 SUPPOSITORY RECTAL ONCE AS NEEDED
Status: DISCONTINUED | OUTPATIENT
Start: 2024-11-30 | End: 2024-12-03 | Stop reason: HOSPADM

## 2024-11-30 RX ORDER — OXYCODONE AND ACETAMINOPHEN 10; 325 MG/1; MG/1
1 TABLET ORAL EVERY 4 HOURS PRN
Status: DISCONTINUED | OUTPATIENT
Start: 2024-11-30 | End: 2024-12-03 | Stop reason: HOSPADM

## 2024-11-30 RX ORDER — LABETALOL HCL 20 MG/4 ML
SYRINGE (ML) INTRAVENOUS
Status: COMPLETED
Start: 2024-11-30 | End: 2024-11-30

## 2024-11-30 RX ORDER — NIFEDIPINE 30 MG/1
30 TABLET, EXTENDED RELEASE ORAL DAILY
Status: DISCONTINUED | OUTPATIENT
Start: 2024-11-30 | End: 2024-12-03 | Stop reason: HOSPADM

## 2024-11-30 RX ORDER — FUROSEMIDE 10 MG/ML
20 INJECTION INTRAMUSCULAR; INTRAVENOUS ONCE
Status: COMPLETED | OUTPATIENT
Start: 2024-11-30 | End: 2024-11-30

## 2024-11-30 RX ORDER — LABETALOL HCL 20 MG/4 ML
80 SYRINGE (ML) INTRAVENOUS ONCE AS NEEDED
Status: DISCONTINUED | OUTPATIENT
Start: 2024-11-30 | End: 2024-12-03 | Stop reason: HOSPADM

## 2024-11-30 RX ORDER — HYDRALAZINE HYDROCHLORIDE 20 MG/ML
10 INJECTION INTRAMUSCULAR; INTRAVENOUS ONCE AS NEEDED
Status: DISCONTINUED | OUTPATIENT
Start: 2024-11-30 | End: 2024-12-03 | Stop reason: HOSPADM

## 2024-11-30 RX ORDER — NIFEDIPINE 10 MG/1
10 CAPSULE ORAL ONCE
Status: COMPLETED | OUTPATIENT
Start: 2024-11-30 | End: 2024-11-30

## 2024-11-30 RX ORDER — DOCUSATE SODIUM 100 MG/1
200 CAPSULE, LIQUID FILLED ORAL 2 TIMES DAILY
Status: DISCONTINUED | OUTPATIENT
Start: 2024-11-30 | End: 2024-12-03 | Stop reason: HOSPADM

## 2024-11-30 RX ORDER — HYDROMORPHONE HYDROCHLORIDE 2 MG/ML
1 INJECTION, SOLUTION INTRAMUSCULAR; INTRAVENOUS; SUBCUTANEOUS EVERY 4 HOURS PRN
Status: DISCONTINUED | OUTPATIENT
Start: 2024-11-30 | End: 2024-12-03 | Stop reason: HOSPADM

## 2024-11-30 RX ORDER — LABETALOL HCL 20 MG/4 ML
20 SYRINGE (ML) INTRAVENOUS ONCE AS NEEDED
Status: DISCONTINUED | OUTPATIENT
Start: 2024-11-30 | End: 2024-12-03 | Stop reason: HOSPADM

## 2024-11-30 RX ORDER — IBUPROFEN 800 MG/1
800 TABLET ORAL EVERY 8 HOURS
Status: DISCONTINUED | OUTPATIENT
Start: 2024-12-01 | End: 2024-12-03 | Stop reason: HOSPADM

## 2024-11-30 RX ORDER — SIMETHICONE 80 MG
1 TABLET,CHEWABLE ORAL EVERY 6 HOURS PRN
Status: DISCONTINUED | OUTPATIENT
Start: 2024-11-30 | End: 2024-12-03 | Stop reason: HOSPADM

## 2024-11-30 RX ORDER — DIPHENHYDRAMINE HCL 25 MG
25 CAPSULE ORAL EVERY 4 HOURS PRN
Status: DISCONTINUED | OUTPATIENT
Start: 2024-11-30 | End: 2024-12-03 | Stop reason: HOSPADM

## 2024-11-30 RX ADMIN — MAGNESIUM SULFATE HEPTAHYDRATE 2 G/HR: 40 INJECTION, SOLUTION INTRAVENOUS at 03:11

## 2024-11-30 RX ADMIN — KETOROLAC TROMETHAMINE 30 MG: 30 INJECTION, SOLUTION INTRAMUSCULAR at 09:11

## 2024-11-30 RX ADMIN — LABETALOL HYDROCHLORIDE 20 MG: 5 INJECTION, SOLUTION INTRAVENOUS at 12:11

## 2024-11-30 RX ADMIN — NIFEDIPINE 30 MG: 30 TABLET, FILM COATED, EXTENDED RELEASE ORAL at 06:11

## 2024-11-30 RX ADMIN — KETOROLAC TROMETHAMINE 30 MG: 30 INJECTION, SOLUTION INTRAMUSCULAR at 03:11

## 2024-11-30 RX ADMIN — NIFEDIPINE 10 MG: 10 CAPSULE ORAL at 06:11

## 2024-11-30 RX ADMIN — KETOROLAC TROMETHAMINE 30 MG: 30 INJECTION, SOLUTION INTRAMUSCULAR at 02:11

## 2024-11-30 RX ADMIN — FUROSEMIDE 20 MG: 10 INJECTION, SOLUTION INTRAMUSCULAR; INTRAVENOUS at 02:11

## 2024-11-30 RX ADMIN — DOCUSATE SODIUM 200 MG: 100 CAPSULE, LIQUID FILLED ORAL at 10:11

## 2024-11-30 RX ADMIN — PRENATAL VITAMINS-IRON FUMARATE 27 MG IRON-FOLIC ACID 0.8 MG TABLET 1 TABLET: at 10:11

## 2024-11-30 NOTE — ASSESSMENT & PLAN NOTE
- - Patient doing well. Continue routine management and advances.  - Continue PO pain meds. Pain well controlled.  - Pre H/H 13.2/39.2 --> post H/H 12.5/37.2; VSS, asymptomatic  - Encourage ambulation  - Contraception - will discuss at postpartum visit  - Lactation - Male infant in NICU

## 2024-11-30 NOTE — SUBJECTIVE & OBJECTIVE
Interval History:   She is doing well this morning. She is tolerating a regular diet without nausea or vomiting. She is voiding spontaneously. She is ambulating. She has not passed flatus, and has not a BM. Vaginal bleeding is mild. She denies fever or chills. Abdominal pain is moderate and controlled with oral medications.   Objective:     Vital Signs (Most Recent):  Temp: 98.3 °F (36.8 °C) (11/29/24 2208)  Pulse: 90 (11/30/24 1207)  Resp: 16 (11/29/24 2208)  BP: 135/76 (11/30/24 1200)  SpO2: 96 % (11/30/24 1207) Vital Signs (24h Range):  Temp:  [97.8 °F (36.6 °C)-98.6 °F (37 °C)] 98.3 °F (36.8 °C)  Pulse:  [0-101] 90  Resp:  [16-18] 16  SpO2:  [92 %-99 %] 96 %  BP: (106-191)/() 135/76     Weight: 110.2 kg (243 lb)  Body mass index is 45.91 kg/m².      Intake/Output Summary (Last 24 hours) at 11/30/2024 1225  Last data filed at 11/30/2024 1200  Gross per 24 hour   Intake 500 ml   Output 1415 ml   Net -915 ml         Significant Labs:  Lab Results   Component Value Date    GROUPTRH O POS 11/29/2024    HEPBSAG Nonreactive 11/29/2024     Recent Labs   Lab 11/30/24  0456   HGB 12.5   HCT 37.2       CBC:   Recent Labs   Lab 11/30/24  0456   WBC 17.22*   RBC 4.35   HGB 12.5   HCT 37.2      MCV 85.5   MCH 28.7   MCHC 33.6     I have personallly reviewed all pertinent lab results from the last 24 hours.    Physical Exam:   Constitutional: She is oriented to person, place, and time. She appears well-developed and well-nourished. No distress.       Cardiovascular:  Normal rate.             Pulmonary/Chest: Effort normal.        Abdominal: Soft. She exhibits abdominal incision. She exhibits no distension.   Incision bandaged                 Neurological: She is alert and oriented to person, place, and time.    Skin: Skin is warm and dry.    Psychiatric: She has a normal mood and affect.       Review of Systems   Constitutional:  Positive for fatigue. Negative for chills and fever.   Eyes:  Negative for visual  disturbance.   Respiratory:  Negative for cough and wheezing.    Cardiovascular:  Negative for chest pain and palpitations.   Gastrointestinal:  Negative for abdominal pain, nausea and vomiting.   Genitourinary:  Negative for dysuria, frequency, hematuria, pelvic pain, vaginal bleeding, vaginal discharge and vaginal pain.   Neurological:  Negative for headaches.   Psychiatric/Behavioral:  Negative for depression.

## 2024-11-30 NOTE — HPI
Rita Guan is a 29 y.o.  at 34w6d presents complaining of elevated blood pressure using a home monitor in excess of 200 systolic and 110 diastolic.  Patient denies any other symptomatology besides swelling.  She denies headache, visual changes, altered sensation, abdominal pain or other issues.  She has reported any elevated blood pressures earlier in this pregnancy.     This IUP is complicated by hyperglycemia and obesity.     Patient denies vaginal bleeding, leakage of fluid, contractions, headache, vision changes, RUQ pain, dysuria, fever, and nausea/vomiting.  Fetal Movement: normal.

## 2024-11-30 NOTE — PROGRESS NOTES
[facility] - Antepartum  Obstetrics  Postpartum Progress Note    Patient Name: Rita Guan  MRN: [id number]  Admission Date: 11/29/2024  Hospital Length of Stay: 1 days  Attending Physician: Diamante Junior MD  Primary Care Provider: Joyce Jackson MD    Subjective:     Principal Problem:Severe preeclampsia, third trimester    Hospital Course:  PPD#1 s/p 1LTCS 2/2 severe preeclampsia. She reports that she is doing well but feeling tired. She was started on Magnesium sulfate last night. She was given lasix last night for decreased urine output. She is now maintaining 30-45cc/hr. She tolerating a piece of chávez this morning and would like to eat. Her male infant is in NICU doing well.    Interval History:   She is doing well this morning. She is tolerating a regular diet without nausea or vomiting. She is voiding spontaneously. She is ambulating. She has not passed flatus, and has not a BM. Vaginal bleeding is mild. She denies fever or chills. Abdominal pain is moderate and controlled with oral medications.   Objective:     Vital Signs (Most Recent):  Temp: 98.3 °F (36.8 °C) (11/29/24 2208)  Pulse: 90 (11/30/24 1207)  Resp: 16 (11/29/24 2208)  BP: 135/76 (11/30/24 1200)  SpO2: 96 % (11/30/24 1207) Vital Signs (24h Range):  Temp:  [97.8 °F (36.6 °C)-98.6 °F (37 °C)] 98.3 °F (36.8 °C)  Pulse:  [0-101] 90  Resp:  [16-18] 16  SpO2:  [92 %-99 %] 96 %  BP: (106-191)/() 135/76     Weight: 110.2 kg (243 lb)  Body mass index is 45.91 kg/m².      Intake/Output Summary (Last 24 hours) at 11/30/2024 1225  Last data filed at 11/30/2024 1200  Gross per 24 hour   Intake 500 ml   Output 1415 ml   Net -915 ml         Significant Labs:  Lab Results   Component Value Date    GROUPTRH O POS 11/29/2024    HEPBSAG Nonreactive 11/29/2024     Recent Labs   Lab 11/30/24  0456   HGB 12.5   HCT 37.2       CBC:   Recent Labs   Lab 11/30/24 0456   WBC 17.22*   RBC 4.35   HGB 12.5   HCT 37.2      MCV 85.5    [facility] 28.7   [facility] 33.6     I have personallly reviewed all pertinent lab results from the last 24 hours.    Physical Exam:   Constitutional: She is oriented to person, place, and time. She appears well-developed and well-nourished. No distress.       Cardiovascular:  Normal rate.             Pulmonary/Chest: Effort normal.        Abdominal: Soft. She exhibits abdominal incision. She exhibits no distension.   Incision bandaged                 Neurological: She is alert and oriented to person, place, and time.    Skin: Skin is warm and dry.    Psychiatric: She has a normal mood and affect.       Review of Systems   Constitutional:  Positive for fatigue. Negative for chills and fever.   Eyes:  Negative for visual disturbance.   Respiratory:  Negative for cough and wheezing.    Cardiovascular:  Negative for chest pain and palpitations.   Gastrointestinal:  Negative for abdominal pain, nausea and vomiting.   Genitourinary:  Negative for dysuria, frequency, hematuria, pelvic pain, vaginal bleeding, vaginal discharge and vaginal pain.   Neurological:  Negative for headaches.   Psychiatric/Behavioral:  Negative for depression.      Assessment/Plan:     29 y.o. female  for:    * Severe preeclampsia, third trimester  - BP: (106-191)/() 135/76  - Currently on magnesium sulfate  - Blood pressures managed without medication  - s/p Lasix x1  - Monitoring output at this time        Status post primary low transverse  section  - - Patient doing well. Continue routine management and advances.  - Continue PO pain meds. Pain well controlled.  - Pre H/H 13.2/39.2 --> post H/H 12.5/37.2; VSS, asymptomatic  - Encourage ambulation  - Contraception - will discuss at postpartum visit  - Lactation - Male infant in NICU        Disposition: As patient meets milestones, will plan to discharge POD#2-4.    Gary Meza MD  Obstetrics  [facility] - Antepartum

## 2024-11-30 NOTE — ANESTHESIA PROCEDURE NOTES
Spinal    Diagnosis: Repeat  Section  Patient location during procedure: OB  Start time: 2024 7:45 PM  Timeout: 2024 7:43 PM  End time: 2024 7:49 PM    Staffing  Authorizing Provider: Chuck Rodriguez MD  Performing Provider: Gabe Zavala CRNA    Staffing  Performed by: Gabe Zavala CRNA  Authorized by: Chuck Rodriguez MD    Preanesthetic Checklist  Completed: patient identified, IV checked, site marked, risks and benefits discussed, surgical consent, monitors and equipment checked, pre-op evaluation and timeout performed  Spinal Block  Patient position: sitting  Prep: ChloraPrep  Patient monitoring: heart rate, continuous pulse ox and frequent blood pressure checks  Approach: midline  Location: L3-4  Injection technique: single shot  CSF Fluid: clear free-flowing CSF  Needle  Needle type: pencil-tip   Needle gauge: 25 G  Needle length: 3.5 in  Additional Documentation: negative aspiration for heme, no paresthesia on injection and incremental injection  Needle localization: anatomical landmarks  Assessment  Sensory level: T6   Dermatomal levels determined by pinch or prick  Ease of block: easy  Patient's tolerance of the procedure: comfortable throughout block and no complaints  Medications:    Medications: bupivacaine (pf) (MARCAINE) injection 0.75% - Intraspinal   1.7 mL - 2024 7:49:00 PM

## 2024-11-30 NOTE — TRANSFER OF CARE
"Anesthesia Transfer of Care Note    Patient: Rita Guan    Procedure(s) Performed: Procedure(s) (LRB):   SECTION (N/A)    Patient location: Labor and Delivery    Anesthesia Type: spinal    Transport from OR: Transported from OR on room air with adequate spontaneous ventilation    Post pain: adequate analgesia    Post assessment: no apparent anesthetic complications    Post vital signs: stable    Level of consciousness: awake, alert and oriented    Nausea/Vomiting: no nausea/vomiting    Complications: none    Transfer of care protocol was followed      Last vitals: Visit Vitals  BP (!) 124/90   Pulse 101   Temp 37 °C (98.6 °F) (Oral)   Resp 18   Ht 5' 1" (1.549 m)   Wt 110.2 kg (243 lb)   SpO2 98%   Breastfeeding No   BMI 45.91 kg/m²     "

## 2024-11-30 NOTE — PLAN OF CARE
Problem:  Fall Injury Risk  Goal: Absence of Fall, Infant Drop and Related Injury  Outcome: Progressing     Problem: Adult Inpatient Plan of Care  Goal: Plan of Care Review  Outcome: Progressing  Goal: Patient-Specific Goal (Individualized)  Outcome: Progressing  Goal: Absence of Hospital-Acquired Illness or Injury  Outcome: Progressing  Goal: Optimal Comfort and Wellbeing  Outcome: Progressing  Goal: Readiness for Transition of Care  Outcome: Progressing     Problem: Bariatric Environmental Safety  Goal: Safety Maintained with Care  Outcome: Progressing     Problem: Infection  Goal: Absence of Infection Signs and Symptoms  Outcome: Progressing     Problem: Wound  Goal: Optimal Coping  Outcome: Progressing  Goal: Optimal Functional Ability  Outcome: Progressing  Goal: Absence of Infection Signs and Symptoms  Outcome: Progressing  Goal: Improved Oral Intake  Outcome: Progressing  Goal: Optimal Pain Control and Function  Outcome: Progressing  Goal: Skin Health and Integrity  Outcome: Progressing  Goal: Optimal Wound Healing  Outcome: Progressing

## 2024-11-30 NOTE — L&D DELIVERY NOTE
[facility] - Labor and Delivery   Section   Operative Note    SUMMARY     Date of Procedure: 2024     Procedure: Procedure(s) (LRB):   SECTION (N/A)    Surgeons and Role:     * Ricki Krueger MD - Primary     * Sylvain Lackey MD - Co-Surgeon    Assisting Surgeon: None    Pre-Operative Diagnosis: Hypertension affecting pregnancy [O16.9] preeclampsia with severe features    Post-Operative Diagnosis: Post-Op Diagnosis Codes:     * Hypertension affecting pregnancy [O16.9] preeclampsia with severe features    Anesthesia: Spinal/Epidural             Description of the Findings of the Procedure:  Patient is a 29-year-old  1 at 34.6 weeks estimated gestational age who presented to the OB Emergency Department because of excessive blood pressures at home.  Patient was found to have continued excessive blood pressures in spite of medical intervention.  Preeclampsia labs showed elevated liver function studies.  Patient continued to have elevated blood pressures that were in the severe range and it was decided to proceed with delivery at this time.  Alternatives, risks, and complications of delivery methods were discussed with the patient and was elected to proceed with primary  section for delivery at this time.  Questions were answered to her satisfaction and operative consents were signed.      Procedure Details   The patient was seen in the operating Room. The risks, benefits, complications, treatment options, and expected outcomes were discussed with the patient.  The patient concurred with the proposed plan, giving informed consent.  The site of surgery properly noted/marked. The patient was taken to Operating Room, identified as Rita Guan and the procedure verified as primary  Delivery. A Time Out was held and the above information confirmed.    After induction of anesthesia, the patient was prepped and draped in the usual sterile manner while placed  in a dorsal supine position with a left lateral tilt.  A valero catheter was also placed per nursing. Preoperative antibiotics per above were administered and a test was performed yielding adequate anesthesia.  A Pfannenstiel incision was made and carried down through the subcutaneous tissue to the fascia. Fascial incision was made and extended transversely. The fascia was grasped with [facility] clamps and  from the underlying rectus tissue superiorly and inferiorly. The peritoneum was identified, found to be free of adherent bowl and entered without complication. The vesico-uterine peritoneum was incised transversely and the bladder flap was bluntly freed from the lower uterine segment. The bladder blade was reinserted to keep the bladder out of the operative field. A low transverse uterine incision was made with knife and extended bilaterly. The amniotic sac was ruptured similarly and the infant was noted to be in vertex position. The patient delivered a single viable male infant with difficulty using vacuum extraction.   After the umbilical cord was clamped and cut cord blood was obtained for evaluation. The placenta was removed intact and appeared normal. The uterus was exteriorized. The uterine outline, tubes and ovaries appeared normal. The uterine incision was closed with running locked sutures of 1. Chromic.  The incision was then imbricated with a Lembert stitch of 1. Chromic.. Hemostasis was observed. The uterus was returned to the abdominal cavity. Incision was reinspected and good hemostasis was noted. The abdominal cavity was irrigated to remove clots.  Adequate hemostasis noted the fascia was reapproximated with 0 Vicryl in running locking fashion.  The subcutaneous area was irrigated and closed with 3-0 plain.  The skin was closed with a subcutaneous stitch of 4-0 Vicryl.      Instrument, sponge, and needle counts were correct prior the abdominal closure and at the conclusion of the case. Valero  "draining clear yellow urine.     Patient tolerated procedure well and was stable and in good condition after the procedure.            Complications: No    Blood Loss: * No values recorded between 2024  8:04 PM and 2024  8:56 PM * 430 cc     Specimens:   Specimen (24h ago, onward)       Start     Ordered    24  Specimen to Pathology Gynecology and Obstetrics  Once        References:    Click here for ordering Quick Tip   Question Answer Comment   Procedure Type: Gynecology and Obstetrics    Specimen Source Placenta    Specimen Class: Routine/Screening    Procedure Type: Biopsy    Clinical Information: IUP 34.6 weeks; severe pre-e    Release to patient Immediate        24  Specimen to Pathology Gynecology and Obstetrics  Once        References:    Click here for ordering Quick Tip   Question Answer Comment   Procedure Type: Gynecology and Obstetrics    Specimen Source Placenta    Procedure Type: Biopsy    Clinical Information: pre e, , 34.6 weeks gestation, no known medical or sx history to note. delivery priomary c section    Release to patient Immediate        24                    Condition: Stable    VTE Risk Mitigation (From admission, onward)           Ordered     IP VTE HIGH RISK PATIENT  Once         24     Place sequential compression device  Until discontinued         24                    Disposition: PACU - hemodynamically stable.    Attestation: Stable         Delivery Information for Cody Guan    Birth information:  YOB: 2023   Time of birth: 8:12 PM   Sex: male   Head Delivery Date/Time:     Delivery type:    Gestational Age: 34w6d       Delivery Providers    Delivering clinician:            Measurements    Weight: 2340 g  Weight (lbs): 5 lb 2.5 oz  Length: 46 cm  Length (in): 18.11"  Head circumference: 32.5 cm  Abdominal girth: 24.5 cm         Apgars    Living status: Living  Apgar " Component Scores:  1 min.:  5 min.:  10 min.:  15 min.:  20 min.:    Skin color:  0  1       Heart rate:  2  2       Reflex irritability:  2  2       Muscle tone:  1  2       Respiratory effort:  2  2       Total:  7  9                                Interventions/Resuscitation    Method: Bulb Suctioning, Tactile Stimulation, Deep Suctioning, CPAP       Cord    Vessels: 3 vessels       Placenta    Placenta delivery date/time:   Placenta removal:            Labor Events:       labor: Yes     Labor Onset Date/Time:         Dilation Complete Date/Time:         Start Pushing Date/Time:       Rupture Date/Time:            Rupture type: INT (Intact)        Fluid Amount:       Fluid Color:        steroids: Partial Course     Antibiotics given for GBS: No     Induction:       Indications for induction:        Augmentation:       Indications for augmentation:       Labor complications:       Additional complications: Severe Preeclampsia, Third Trimester        Cervical ripening:                     Delivery:      Episiotomy:       Indication for Episiotomy:       Perineal Lacerations:   Repaired:      Periurethral Laceration:   Repaired:     Labial Laceration:   Repaired:     Sulcus Laceration:   Repaired:     Vaginal Laceration:   Repaired:     Cervical Laceration:   Repaired:     Repair suture:       Repair # of packets:       Last Value - EBL - Nursing (mL):       Sum - EBL - Nursing (mL): 0     Last Value - EBL - Anesthesia (mL):      Calculated QBL (mL):       Running total QBL (mL):       Vaginal Sweep Performed:       Surgicount Correct:         Other providers:            Details (if applicable):  Trial of Labor      Categorization:      Priority:     Indications for :     Incision Type:       Additional  information:  Forceps:    Vacuum:    Breech:    Observed anomalies    Other (Comments):

## 2024-11-30 NOTE — ANESTHESIA PREPROCEDURE EVALUATION
[facility] - Antepartum  Anesthesiology   Section    Patient Name: Rita Guan  MRN: [id number]  Admission Date: 2024  Primary Care Provider: Joyce Jackson MD  Attending Physician: Diamante Junior MD    Subjective:     Primary section for pre-eclampsia with severe features    OB History    Para Term  AB Living   1 0 0 0 0 0   SAB IAB Ectopic Multiple Live Births   0 0 0 0 0      # Outcome Date GA Lbr Jorgito/2nd Weight Sex Type Anes PTL Lv   1 Current              History reviewed. No pertinent past medical history.  Past Surgical History:   Procedure Laterality Date    wisdom teeth removal         PTA Medications   Medication Sig    prenat.vits,amanda,min-iron-folic Tab Take 1 tablet by mouth once daily.    albuterol (PROVENTIL HFA) 90 mcg/actuation inhaler Inhale 2 puffs into the lungs every 6 (six) hours as needed for Wheezing. Rescue       Review of patient's allergies indicates:   Allergen Reactions    Hydrocodone Hives        Tobacco Use    Smoking status: Never    Smokeless tobacco: Never   Substance and Sexual Activity    Alcohol use: Not Currently    Drug use: Never    Sexual activity: Yes     Partners: Male     Review of Systems   Objective:     Vital Signs (Most Recent):  Temp: 37 °C (98.6 °F) (24 170)  Pulse: 78 (24 183)  Resp: 18 (24 170)  BP: (!) 175/107 (24 1853)  SpO2: 98 % (24 183) Vital Signs (24h Range):  Temp:  [37 °C (98.6 °F)] 37 °C (98.6 °F)  Pulse:  [68-95] 78  Resp:  [18] 18  SpO2:  [97 %-99 %] 98 %  BP: (149-184)/() 175/107     Weight: 110.2 kg (243 lb)  Body mass index is 45.91 kg/m².    Significant Labs:  Lab Results   Component Value Date    WBC 12.77 (H) 2024    HGB 13.2 2024    HCT 39.2 2024    MCV 85.4 2024     2024       Assessment/Plan:     29 y.o. female  at 34w6d for:  section    Gabe Lucas, YEISON  Anesthesiology  [facility] -  Antepartum         Pre-op Assessment    I have reviewed the Patient Summary Reports.     I have reviewed the Nursing Notes. I have reviewed the NPO Status.   I have reviewed the Medications.     Review of Systems  Anesthesia Hx:             Denies Family Hx of Anesthesia complications.    Denies Personal Hx of Anesthesia complications.                    Hematology/Oncology:  Hematology Normal   Oncology Normal                                   EENT/Dental:  EENT/Dental Normal           Cardiovascular:  Cardiovascular Normal                                              Pulmonary:  Pulmonary Normal                       Renal/:  Renal/ Normal                 Hepatic/GI:  Hepatic/GI Normal                    Musculoskeletal:  Musculoskeletal Normal                OB/GYN/PEDS:  Pre-eclampsia with severe features           Neurological:  Neurology Normal                                      Endocrine:  Endocrine Normal            Dermatological:  Skin Normal    Psych:  Psychiatric Normal                    Physical Exam  General: Well nourished, Cooperative, Oriented and Alert    Airway:  Mallampati: II   Mouth Opening: Normal  TM Distance: Normal  Tongue: Normal  Neck ROM: Normal ROM        Anesthesia Plan  Type of Anesthesia, risks & benefits discussed:    Anesthesia Type: Spinal  Intra-op Monitoring Plan: Standard ASA Monitors  Post Op Pain Control Plan: multimodal analgesia, IV/PO Opioids PRN and intrathecal opioid  Informed Consent: Informed consent signed with the Patient and all parties understand the risks and agree with anesthesia plan.  All questions answered.   ASA Score: 3  Day of Surgery Review of History & Physical: H&P Update referred to the surgeon/provider.    Ready For Surgery From Anesthesia Perspective.     .

## 2024-11-30 NOTE — ASSESSMENT & PLAN NOTE
- BP: (106-191)/() 135/76  - Currently on magnesium sulfate  - Blood pressures managed without medication  - s/p Lasix x1  - Monitoring output at this time

## 2024-11-30 NOTE — HOSPITAL COURSE
PPD#1 s/p 1LTCS 2/2 severe preeclampsia. She reports that she is doing well but feeling tired. She was started on Magnesium sulfate last night. She was given lasix last night for decreased urine output. She is now maintaining 30-45cc/hr. She tolerating a piece of chávez this morning and would like to eat. Her male infant is in NICU doing well.

## 2024-12-01 LAB — HIV 1+2 AB+HIV1 P24 AG SERPL QL IA: NONREACTIVE

## 2024-12-01 PROCEDURE — 11000001 HC ACUTE MED/SURG PRIVATE ROOM

## 2024-12-01 PROCEDURE — 25000003 PHARM REV CODE 250: Performed by: SPECIALIST

## 2024-12-01 PROCEDURE — 25000003 PHARM REV CODE 250: Performed by: OBSTETRICS & GYNECOLOGY

## 2024-12-01 RX ADMIN — DOCUSATE SODIUM 200 MG: 100 CAPSULE, LIQUID FILLED ORAL at 08:12

## 2024-12-01 RX ADMIN — OXYCODONE AND ACETAMINOPHEN 1 TABLET: 10; 325 TABLET ORAL at 10:12

## 2024-12-01 RX ADMIN — IBUPROFEN 800 MG: 800 TABLET, FILM COATED ORAL at 09:12

## 2024-12-01 RX ADMIN — OXYCODONE AND ACETAMINOPHEN 1 TABLET: 10; 325 TABLET ORAL at 05:12

## 2024-12-01 RX ADMIN — OXYCODONE AND ACETAMINOPHEN 1 TABLET: 10; 325 TABLET ORAL at 04:12

## 2024-12-01 RX ADMIN — IBUPROFEN 800 MG: 800 TABLET, FILM COATED ORAL at 06:12

## 2024-12-01 RX ADMIN — OXYCODONE AND ACETAMINOPHEN 1 TABLET: 10; 325 TABLET ORAL at 08:12

## 2024-12-01 RX ADMIN — SIMETHICONE 80 MG: 80 TABLET, CHEWABLE ORAL at 09:12

## 2024-12-01 RX ADMIN — PRENATAL VITAMINS-IRON FUMARATE 27 MG IRON-FOLIC ACID 0.8 MG TABLET 1 TABLET: at 08:12

## 2024-12-01 RX ADMIN — OXYCODONE AND ACETAMINOPHEN 1 TABLET: 10; 325 TABLET ORAL at 12:12

## 2024-12-01 RX ADMIN — NIFEDIPINE 30 MG: 30 TABLET, FILM COATED, EXTENDED RELEASE ORAL at 08:12

## 2024-12-01 RX ADMIN — SIMETHICONE 80 MG: 80 TABLET, CHEWABLE ORAL at 06:12

## 2024-12-01 RX ADMIN — DOCUSATE SODIUM 200 MG: 100 CAPSULE, LIQUID FILLED ORAL at 09:12

## 2024-12-01 RX ADMIN — IBUPROFEN 800 MG: 800 TABLET, FILM COATED ORAL at 02:12

## 2024-12-01 NOTE — PROGRESS NOTES
Delivery Progress Note  Obstetrics      SUBJECTIVE:     Postpartum Day 2:  Delivery    Ms. Guan states she feels well. Her pain is well controlled with current medications. The baby is feeding via NICU. The patient is ambulating well. Ms. Guan is tolerating a normal diet. Flatus has been passed. Urinary output is adequate.    OBJECTIVE:     Vital Signs (Most Recent):  Temp: 98.3 °F (36.8 °C) (24)  Pulse: 89 (24)  Resp: 17 (24)  BP: 108/69 (24)  SpO2: 96 % (24)    Vital Signs Range (Last 24H):  Temp:  [97.5 °F (36.4 °C)-98.3 °F (36.8 °C)]   Pulse:  []   Resp:  [16-18]   BP: (108-155)/(67-93)   SpO2:  [93 %-100 %]     I & O (Last 24H):  Intake/Output Summary (Last 24 hours) at 2024 0747  Last data filed at 2024 2116  Gross per 24 hour   Intake --   Output 2160 ml   Net -2160 ml     Physical Exam:  General:    No distress, alert and oriented   Breasts:  No masses and nontender   Abdomen:  Soft, normal bowel sounds, appropriately tender   Fundus:  Firm, below umbilicus   Incision:  Intact, no erythema or drainage   Lochia:   Rubra, minimal   Lower ext:  No calf tenderness     Hemoglobin/Hematocrit  Recent Labs   Lab 24  0456   HGB 12.5   HCT 37.2         ASSESSMENT/PLAN:     Status post  section POD#2    Continue routine postpartum care  Encourage ambulation  BP's mild to normal ranges-- continue to monitor

## 2024-12-01 NOTE — PLAN OF CARE
Problem:  Fall Injury Risk  Goal: Absence of Fall, Infant Drop and Related Injury  Outcome: Progressing     Problem: Adult Inpatient Plan of Care  Goal: Plan of Care Review  Outcome: Progressing  Goal: Patient-Specific Goal (Individualized)  Description: I want to utilize a breastpump   Outcome: Progressing  Goal: Absence of Hospital-Acquired Illness or Injury  Outcome: Progressing  Goal: Optimal Comfort and Wellbeing  Outcome: Progressing  Goal: Readiness for Transition of Care  Outcome: Progressing     Problem: Bariatric Environmental Safety  Goal: Safety Maintained with Care  Outcome: Progressing     Problem: Infection  Goal: Absence of Infection Signs and Symptoms  Outcome: Progressing     Problem: Wound  Goal: Optimal Coping  Outcome: Progressing  Goal: Optimal Functional Ability  Outcome: Progressing  Goal: Absence of Infection Signs and Symptoms  Outcome: Progressing  Goal: Improved Oral Intake  Outcome: Progressing  Goal: Optimal Pain Control and Function  Outcome: Progressing  Goal: Skin Health and Integrity  Outcome: Progressing  Goal: Optimal Wound Healing  Outcome: Progressing     Problem: Postpartum ( Delivery)  Goal: Successful Parent Role Transition  Outcome: Progressing  Goal: Hemostasis  Outcome: Progressing  Goal: Effective Bowel Elimination  Outcome: Progressing  Goal: Fluid and Electrolyte Balance  Outcome: Progressing  Goal: Absence of Infection Signs and Symptoms  Outcome: Progressing  Goal: Anesthesia/Sedation Recovery  Outcome: Progressing  Goal: Optimal Pain Control and Function  Outcome: Progressing  Goal: Nausea and Vomiting Relief  Outcome: Progressing  Goal: Effective Urinary Elimination  Outcome: Progressing  Goal: Effective Oxygenation and Ventilation  Outcome: Progressing

## 2024-12-02 PROCEDURE — 11000001 HC ACUTE MED/SURG PRIVATE ROOM

## 2024-12-02 PROCEDURE — 25000003 PHARM REV CODE 250: Performed by: SPECIALIST

## 2024-12-02 PROCEDURE — 25000003 PHARM REV CODE 250: Performed by: OBSTETRICS & GYNECOLOGY

## 2024-12-02 RX ADMIN — OXYCODONE AND ACETAMINOPHEN 1 TABLET: 10; 325 TABLET ORAL at 07:12

## 2024-12-02 RX ADMIN — DOCUSATE SODIUM 200 MG: 100 CAPSULE, LIQUID FILLED ORAL at 07:12

## 2024-12-02 RX ADMIN — IBUPROFEN 800 MG: 800 TABLET, FILM COATED ORAL at 02:12

## 2024-12-02 RX ADMIN — OXYCODONE AND ACETAMINOPHEN 1 TABLET: 10; 325 TABLET ORAL at 04:12

## 2024-12-02 RX ADMIN — PRENATAL VITAMINS-IRON FUMARATE 27 MG IRON-FOLIC ACID 0.8 MG TABLET 1 TABLET: at 07:12

## 2024-12-02 RX ADMIN — NIFEDIPINE 30 MG: 30 TABLET, FILM COATED, EXTENDED RELEASE ORAL at 07:12

## 2024-12-02 RX ADMIN — OXYCODONE AND ACETAMINOPHEN 1 TABLET: 10; 325 TABLET ORAL at 02:12

## 2024-12-02 RX ADMIN — IBUPROFEN 800 MG: 800 TABLET, FILM COATED ORAL at 10:12

## 2024-12-02 RX ADMIN — IBUPROFEN 800 MG: 800 TABLET, FILM COATED ORAL at 05:12

## 2024-12-02 RX ADMIN — OXYCODONE AND ACETAMINOPHEN 1 TABLET: 10; 325 TABLET ORAL at 11:12

## 2024-12-02 NOTE — PROGRESS NOTES
PostPartum Progress Note        Subjective:      Post-Operative Day #3 after  delivery secondary to severe preeclampsia at 34weeks .    Patient is without complaints. Lochia decreasing.  Pumping breast milk for baby in NICU.  Pain is well controlled. Patient is ambulating. Tolerating Full Regular diet.Overall mother and baby are doing well.     Objective:      Temp:  [97.8 °F (36.6 °C)-98.5 °F (36.9 °C)] 98.3 °F (36.8 °C)  Pulse:  [74-94] 85  Resp:  [16-18] 16  SpO2:  [95 %-99 %] 96 %  BP: (110-139)/(71-90) 124/73    Intake/Output Summary (Last 24 hours) at 2024 0826  Last data filed at 2024 1800  Gross per 24 hour   Intake --   Output 2250 ml   Net -2250 ml     Body mass index is 45.91 kg/m².    General: no acute distress  Abdomen: soft, non-tender, non-distended; Fundus firm and at the umbilicus  Incision- intact, healing well, no sign of infection  Extremities: non-tender, symmetric, 1+ edema    Group & Rh   Date Value Ref Range Status   2024 O POS  Final     Recent Results (from the past 2 weeks)   CBC with Differential    Collection Time: 24  4:56 AM   Result Value Ref Range    WBC 17.22 (H) 4.50 - 11.50 x10(3)/mcL    Hgb 12.5 12.0 - 16.0 g/dL    Hct 37.2 37.0 - 47.0 %    Platelet 163 130 - 400 x10(3)/mcL   CBC with Differential    Collection Time: 24  5:41 PM   Result Value Ref Range    WBC 12.77 (H) 4.50 - 11.50 x10(3)/mcL    Hgb 13.2 12.0 - 16.0 g/dL    Hct 39.2 37.0 - 47.0 %    Platelet 163 130 - 400 x10(3)/mcL          Assessment:     29 y.o.  S/P  Delivery Post-Operative Day #3  - Doing Well      Plan:     1. Continue routine postpartum care  2. Plan for D/C in AM

## 2024-12-03 VITALS
SYSTOLIC BLOOD PRESSURE: 146 MMHG | BODY MASS INDEX: 45.88 KG/M2 | OXYGEN SATURATION: 97 % | TEMPERATURE: 99 F | DIASTOLIC BLOOD PRESSURE: 92 MMHG | RESPIRATION RATE: 16 BRPM | HEART RATE: 108 BPM | WEIGHT: 243 LBS | HEIGHT: 61 IN

## 2024-12-03 LAB
ABO + RH BLD: NORMAL
ABO + RH BLD: NORMAL
BLD PROD TYP BPU: NORMAL
BLD PROD TYP BPU: NORMAL
BLOOD UNIT EXPIRATION DATE: NORMAL
BLOOD UNIT EXPIRATION DATE: NORMAL
BLOOD UNIT TYPE CODE: 5100
BLOOD UNIT TYPE CODE: 5100
CROSSMATCH INTERPRETATION: NORMAL
CROSSMATCH INTERPRETATION: NORMAL
DISPENSE STATUS: NORMAL
DISPENSE STATUS: NORMAL
PSYCHE PATHOLOGY RESULT: NORMAL
RUBV IGG SERPL IA-ACNC: 0.6
RUBV IGG SERPL QL IA: NEGATIVE
UNIT NUMBER: NORMAL
UNIT NUMBER: NORMAL

## 2024-12-03 PROCEDURE — 25000003 PHARM REV CODE 250: Performed by: OBSTETRICS & GYNECOLOGY

## 2024-12-03 PROCEDURE — 25000003 PHARM REV CODE 250: Performed by: SPECIALIST

## 2024-12-03 RX ORDER — OXYCODONE AND ACETAMINOPHEN 5; 325 MG/1; MG/1
1 TABLET ORAL EVERY 4 HOURS PRN
Qty: 28 TABLET | Refills: 0 | Status: SHIPPED | OUTPATIENT
Start: 2024-12-03

## 2024-12-03 RX ORDER — IBUPROFEN 600 MG/1
600 TABLET ORAL EVERY 6 HOURS PRN
Qty: 30 TABLET | Refills: 0 | Status: SHIPPED | OUTPATIENT
Start: 2024-12-03

## 2024-12-03 RX ORDER — NIFEDIPINE 30 MG/1
30 TABLET, EXTENDED RELEASE ORAL DAILY
Qty: 30 TABLET | Refills: 11 | Status: SHIPPED | OUTPATIENT
Start: 2024-12-04 | End: 2025-12-04

## 2024-12-03 RX ADMIN — OXYCODONE AND ACETAMINOPHEN 1 TABLET: 10; 325 TABLET ORAL at 12:12

## 2024-12-03 RX ADMIN — OXYCODONE AND ACETAMINOPHEN 1 TABLET: 10; 325 TABLET ORAL at 04:12

## 2024-12-03 RX ADMIN — OXYCODONE AND ACETAMINOPHEN 1 TABLET: 10; 325 TABLET ORAL at 11:12

## 2024-12-03 RX ADMIN — NIFEDIPINE 30 MG: 30 TABLET, FILM COATED, EXTENDED RELEASE ORAL at 08:12

## 2024-12-03 RX ADMIN — OXYCODONE AND ACETAMINOPHEN 1 TABLET: 10; 325 TABLET ORAL at 08:12

## 2024-12-03 RX ADMIN — IBUPROFEN 800 MG: 800 TABLET, FILM COATED ORAL at 05:12

## 2024-12-03 RX ADMIN — IBUPROFEN 800 MG: 800 TABLET, FILM COATED ORAL at 03:12

## 2024-12-03 RX ADMIN — DOCUSATE SODIUM 200 MG: 100 CAPSULE, LIQUID FILLED ORAL at 08:12

## 2024-12-03 RX ADMIN — PRENATAL VITAMINS-IRON FUMARATE 27 MG IRON-FOLIC ACID 0.8 MG TABLET 1 TABLET: at 08:12

## 2024-12-03 NOTE — PLAN OF CARE
Problem: Adult Inpatient Plan of Care  Goal: Plan of Care Review  Outcome: Progressing  Goal: Patient-Specific Goal (Individualized)  Description: I want to utilize a breastpump   Outcome: Progressing  Goal: Absence of Hospital-Acquired Illness or Injury  Outcome: Progressing  Goal: Optimal Comfort and Wellbeing  Outcome: Progressing  Goal: Readiness for Transition of Care  Outcome: Progressing     Problem: Postpartum ( Delivery)  Goal: Successful Parent Role Transition  Outcome: Progressing  Goal: Hemostasis  Outcome: Progressing  Goal: Effective Bowel Elimination  Outcome: Progressing  Goal: Fluid and Electrolyte Balance  Outcome: Progressing  Goal: Absence of Infection Signs and Symptoms  Outcome: Progressing  Goal: Anesthesia/Sedation Recovery  Outcome: Progressing  Goal: Optimal Pain Control and Function  Outcome: Progressing  Goal: Nausea and Vomiting Relief  Outcome: Progressing  Goal: Effective Urinary Elimination  Outcome: Progressing  Goal: Effective Oxygenation and Ventilation  Outcome: Progressing

## 2024-12-03 NOTE — DISCHARGE SUMMARY
Delivery Discharge Summary  Obstetrics      Primary OB Clinician: Diamante Junior MD    Discharge Provider: MALINA WALKER MD    Admission date: 2024  Discharge date: 2024    Admit Dx:   Discharge Dx:    Patient Active Problem List   Diagnosis    Hyperglycemia    Obesity due to excess calories    First trimester pregnancy    Acute bronchitis    Severe preeclampsia, third trimester    Status post primary low transverse  section       Procedure: , due to severe pre-eclampsia at 34wga    Hospital Course:  Rita Guan is a 29 y.o. now  who was admitted on 2024 for severe range blood pressures at 34wga followed by recommended delivery. Patient delivered a viable . Please see delivery note for further details. Pt was in stable condition post delivery and was transferred to the Mother-Baby Unit. Baby is stable under bili lights in NICU. Mom is pumping for baby. Her postpartum course was complicated by MgSO4. On the date of discharge, patient's pain is controlled with oral pain medications. She is tolerating ambulation without SOB or CP, and PO diet without N/V. Reported lochia is within the normal range. Pt in stable condition and ready for discharge.     Pertinent studies:  Postpartum CBC  Lab Results   Component Value Date    WBC 17.22 (H) 2024    HGB 12.5 2024    HCT 37.2 2024    MCV 85.5 2024     2024       Disposition: To home, self care    Follow Up:  in 2-3 days for BP check    Patient Instructions:   1. Call the office for any bleeding >2 pads/hour for >2 hours, temperature >100.4, pain that is uncontrolled with medications, or for any other concerns.  2. Pelvic rest and no tub baths x 6 weeks.  3. No driving while on narcotics.  4. Continue procardia XL 30mg daily.    Current Discharge Medication List        CONTINUE these medications which have NOT CHANGED    Details   prenat.vits,amanda,min-iron-folic Tab Take 1 tablet  by mouth once daily.  Qty: 90 each, Refills: 3      albuterol (PROVENTIL HFA) 90 mcg/actuation inhaler Inhale 2 puffs into the lungs every 6 (six) hours as needed for Wheezing. Rescue  Qty: 18 g, Refills: 0    Associated Diagnoses: Acute bronchitis, unspecified organism             MALINA WALKER

## 2024-12-03 NOTE — PLAN OF CARE
Problem:  Fall Injury Risk  Goal: Absence of Fall, Infant Drop and Related Injury  Outcome: Progressing     Problem: Adult Inpatient Plan of Care  Goal: Plan of Care Review  Outcome: Progressing  Goal: Patient-Specific Goal (Individualized)  Description: I want to utilize a breastpump   Outcome: Progressing  Flowsheets (Taken 2024)  Patient/Family-Specific Goals (Include Timeframe): visit baby in nicu  Goal: Absence of Hospital-Acquired Illness or Injury  Outcome: Progressing  Goal: Optimal Comfort and Wellbeing  Outcome: Progressing  Goal: Readiness for Transition of Care  Outcome: Progressing     Problem: Bariatric Environmental Safety  Goal: Safety Maintained with Care  Outcome: Progressing     Problem: Infection  Goal: Absence of Infection Signs and Symptoms  Outcome: Progressing     Problem: Wound  Goal: Optimal Coping  Outcome: Progressing  Goal: Optimal Functional Ability  Outcome: Progressing  Goal: Absence of Infection Signs and Symptoms  Outcome: Progressing  Goal: Improved Oral Intake  Outcome: Progressing  Goal: Optimal Pain Control and Function  Outcome: Progressing  Goal: Skin Health and Integrity  Outcome: Progressing  Goal: Optimal Wound Healing  Outcome: Progressing     Problem: Postpartum ( Delivery)  Goal: Successful Parent Role Transition  Outcome: Progressing  Goal: Hemostasis  Outcome: Progressing  Goal: Effective Bowel Elimination  Outcome: Progressing  Goal: Fluid and Electrolyte Balance  Outcome: Progressing  Goal: Absence of Infection Signs and Symptoms  Outcome: Progressing  Goal: Anesthesia/Sedation Recovery  Outcome: Progressing  Goal: Optimal Pain Control and Function  Outcome: Progressing  Goal: Nausea and Vomiting Relief  Outcome: Progressing  Goal: Effective Urinary Elimination  Outcome: Progressing  Goal: Effective Oxygenation and Ventilation  Outcome: Progressing

## 2024-12-12 NOTE — ANESTHESIA POSTPROCEDURE EVALUATION
Anesthesia Post Evaluation    Patient: Rita Guna    Procedure(s) Performed: Procedure(s) (LRB):   SECTION (N/A)    Final Anesthesia Type: regional (Regional comprises either epidural or spinal)      Patient location during evaluation: labor & delivery  Patient participation: Yes- Able to Participate  Post-procedure vital signs: reviewed and stable (No complaints at this time)  Pain management: adequate      Anesthetic complications: no                    Vitals Value Taken Time   /92 24 0859   Temp 37.1 °C (98.7 °F) 24 0859   Pulse 108 24 0859   Resp 16 24 1151   SpO2 97 % 24 0859         Event Time   Out of Recovery 2024 22:03:00         Pain/Tia Score: No data recorded

## (undated) DEVICE — SUT CHROMIC GUT 2-0 CT-1 27IN

## (undated) DEVICE — CUP TENDERTOUCH SIL 60MM

## (undated) DEVICE — SUT 1 36IN CHROMIC GUT CTX

## (undated) DEVICE — SUT 0 36IN PDS II VIO MONO

## (undated) DEVICE — SEE MEDLINE ITEM 157117

## (undated) DEVICE — SOL WATER STRL IRR 1000ML

## (undated) DEVICE — SUT CTD VICRYL 0 UND BR CT

## (undated) DEVICE — SOL NACL IRR 1000ML BTL

## (undated) DEVICE — SUT 2-0 VICRYL / CT-1

## (undated) DEVICE — PAD UNDERPAD 30X30

## (undated) DEVICE — BINDER ABDOM 4PANEL 12IN LG/XL

## (undated) DEVICE — PAD SANITARY OB STERILE

## (undated) DEVICE — CAP BABY BEANIE

## (undated) DEVICE — TRAY CATH 1-LYR URIMTR 16FR

## (undated) DEVICE — SEE MEDLINE ITEM 156931

## (undated) DEVICE — SUT 3/0 36IN COATED VICRYL

## (undated) DEVICE — BULB SYRINGE EAR IRRIGATION

## (undated) DEVICE — ELECTRODE REM PLYHSV RETURN 9